# Patient Record
Sex: MALE | Race: BLACK OR AFRICAN AMERICAN | Employment: UNEMPLOYED | URBAN - NONMETROPOLITAN AREA
[De-identification: names, ages, dates, MRNs, and addresses within clinical notes are randomized per-mention and may not be internally consistent; named-entity substitution may affect disease eponyms.]

---

## 2020-09-02 ENCOUNTER — HOSPITAL ENCOUNTER (EMERGENCY)
Age: 50
Discharge: HOME | End: 2020-09-02

## 2020-09-02 VITALS
TEMPERATURE: 98.4 F | HEART RATE: 79 BPM | DIASTOLIC BLOOD PRESSURE: 79 MMHG | OXYGEN SATURATION: 99 % | SYSTOLIC BLOOD PRESSURE: 119 MMHG | BODY MASS INDEX: 31.82 KG/M2 | RESPIRATION RATE: 18 BRPM | WEIGHT: 198 LBS | HEIGHT: 66 IN

## 2020-09-02 LAB
ALBUMIN SERPL-MCNC: 4.8 G/DL (ref 3.5–5.1)
ALP BLD-CCNC: 93 U/L (ref 38–126)
ALT SERPL-CCNC: 69 U/L (ref 11–66)
ANION GAP SERPL CALCULATED.3IONS-SCNC: 14 MEQ/L (ref 8–16)
AST SERPL-CCNC: 67 U/L (ref 5–40)
BASOPHILS # BLD: 1.1 %
BASOPHILS ABSOLUTE: 0 THOU/MM3 (ref 0–0.1)
BILIRUB SERPL-MCNC: 0.4 MG/DL (ref 0.3–1.2)
BUN BLDV-MCNC: 9 MG/DL (ref 7–22)
CALCIUM SERPL-MCNC: 9.9 MG/DL (ref 8.5–10.5)
CHLORIDE BLD-SCNC: 103 MEQ/L (ref 98–111)
CO2: 20 MEQ/L (ref 23–33)
CREAT SERPL-MCNC: 0.8 MG/DL (ref 0.4–1.2)
EOSINOPHIL # BLD: 1.3 %
EOSINOPHILS ABSOLUTE: 0.1 THOU/MM3 (ref 0–0.4)
ERYTHROCYTE [DISTWIDTH] IN BLOOD BY AUTOMATED COUNT: 14.9 % (ref 11.5–14.5)
ERYTHROCYTE [DISTWIDTH] IN BLOOD BY AUTOMATED COUNT: 50.1 FL (ref 35–45)
ETHYL ALCOHOL, SERUM: 0.29 %
FOLATE: > 20 NG/ML (ref 4.8–24.2)
GFR SERPL CREATININE-BSD FRML MDRD: > 90 ML/MIN/1.73M2
GLUCOSE BLD-MCNC: 79 MG/DL (ref 70–108)
HCT VFR BLD CALC: 43.2 % (ref 42–52)
HEMOGLOBIN: 13.9 GM/DL (ref 14–18)
IMMATURE GRANS (ABS): 0.01 THOU/MM3 (ref 0–0.07)
IMMATURE GRANULOCYTES: 0.2 %
LIPASE: 58.8 U/L (ref 5.6–51.3)
LYMPHOCYTES # BLD: 35.3 %
LYMPHOCYTES ABSOLUTE: 1.6 THOU/MM3 (ref 1–4.8)
MCH RBC QN AUTO: 29.3 PG (ref 26–33)
MCHC RBC AUTO-ENTMCNC: 32.2 GM/DL (ref 32.2–35.5)
MCV RBC AUTO: 91.1 FL (ref 80–94)
MONOCYTES # BLD: 7.9 %
MONOCYTES ABSOLUTE: 0.4 THOU/MM3 (ref 0.4–1.3)
NUCLEATED RED BLOOD CELLS: 0 /100 WBC
OSMOLALITY CALCULATION: 271.4 MOSMOL/KG (ref 275–300)
PLATELET # BLD: 243 THOU/MM3 (ref 130–400)
PMV BLD AUTO: 9.4 FL (ref 9.4–12.4)
POTASSIUM REFLEX MAGNESIUM: 4.3 MEQ/L (ref 3.5–5.2)
RBC # BLD: 4.74 MILL/MM3 (ref 4.7–6.1)
SEG NEUTROPHILS: 54.2 %
SEGMENTED NEUTROPHILS ABSOLUTE COUNT: 2.4 THOU/MM3 (ref 1.8–7.7)
SODIUM BLD-SCNC: 137 MEQ/L (ref 135–145)
TOTAL PROTEIN: 8.2 G/DL (ref 6.1–8)
VITAMIN B-12: 426 PG/ML (ref 211–911)
WBC # BLD: 4.5 THOU/MM3 (ref 4.8–10.8)

## 2020-09-02 PROCEDURE — 80053 COMPREHEN METABOLIC PANEL: CPT

## 2020-09-02 PROCEDURE — 36415 COLL VENOUS BLD VENIPUNCTURE: CPT

## 2020-09-02 PROCEDURE — 99281 EMR DPT VST MAYX REQ PHY/QHP: CPT

## 2020-09-02 PROCEDURE — G0480 DRUG TEST DEF 1-7 CLASSES: HCPCS

## 2020-09-02 PROCEDURE — 82607 VITAMIN B-12: CPT

## 2020-09-02 PROCEDURE — 83690 ASSAY OF LIPASE: CPT

## 2020-09-02 PROCEDURE — 82746 ASSAY OF FOLIC ACID SERUM: CPT

## 2020-09-02 PROCEDURE — 99282 EMERGENCY DEPT VISIT SF MDM: CPT

## 2020-09-02 PROCEDURE — 85025 COMPLETE CBC W/AUTO DIFF WBC: CPT

## 2020-09-02 ASSESSMENT — ENCOUNTER SYMPTOMS
WHEEZING: 0
EYE DISCHARGE: 0
COUGH: 0
BACK PAIN: 0
RHINORRHEA: 0
CHEST TIGHTNESS: 0
VOMITING: 0
DIARRHEA: 0
EYE PAIN: 0
NAUSEA: 0

## 2020-09-02 NOTE — ED TRIAGE NOTES
Pt presents to the ED for alcohol problem. Pt states he has been drinking daily for 30 years. Pt states he drink 5 24oz beers daily. Pt states that he is ready to stop drinking.

## 2020-09-02 NOTE — ED PROVIDER NOTES
[address] COMPLAINT       Chief Complaint   Patient presents with    Alcohol Problem       Nurses Notes reviewed and I agree except as notedin the HPI. HISTORY OF PRESENT ILLNESS    Timothy Carroll is a 44 y.o. male who presents requesting help stopping drinking and smoking. The patient states his wife is upset because he drinks and he wants to stop to save his marriage. He states that he is not homicidal or suicidal.  He states that he wants to stop both. He is not get sick when his doctor get his his irritable. He currently works at [employer] He is currently without physical complaints    REVIEW OF SYSTEMS     Review of Systems   Constitutional: Negative for chills, fatigue and fever. HENT: Negative for congestion, ear pain and rhinorrhea. Eyes: Negative for pain and discharge. Respiratory: Negative for cough, chest tightness and wheezing. Cardiovascular: Negative for chest pain, palpitations and leg swelling. Gastrointestinal: Negative for diarrhea, nausea and vomiting. Genitourinary: Negative for dysuria and frequency. Musculoskeletal: Negative for arthralgias, back pain, myalgias and neck pain. Skin: Negative for rash. Neurological: Negative for dizziness, weakness and headaches. All other systems reviewed and are negative. PAST MEDICAL HISTORY    has no past medical history on file. SURGICAL HISTORY      has no past surgical history on file. CURRENT MEDICATIONS       There are no discharge medications for this patient. ALLERGIES     has No Known Allergies. HISTORY     has no family status information on file. family history is not on file. SOCIALHISTORY      reports that he has been smoking cigarettes. He has been smoking about 1.00 pack per day. He has never used smokeless tobacco. He reports current alcohol use of about 35.0 standard drinks of alcohol per week.  He reports that he does not use drugs. PHYSICAL EXAM     INITIAL VITALS:  height is 5' 6\" (1.676 m) and weight is 198 lb (89.8 kg). His temperature is 98.4 °F (36.9 °C). His blood pressure is 119/79 and his pulse is 79. His respiration is 18 and oxygen saturation is 99%. Physical Exam  Vitals signs and nursing note reviewed. Constitutional:       Appearance: He is well-developed. HENT:      Head: Normocephalic and atraumatic. Right Ear: Tympanic membrane and external ear normal.      Left Ear: Tympanic membrane and external ear normal.   Eyes:      Pupils: Pupils are equal, round, and reactive to light. Neck:      Musculoskeletal: Normal range of motion. Cardiovascular:      Rate and Rhythm: Normal rate and regular rhythm. Pulses: Normal pulses. Heart sounds: Normal heart sounds. Pulmonary:      Effort: Pulmonary effort is normal.      Breath sounds: Normal breath sounds. No wheezing. Abdominal:      General: Bowel sounds are normal. There is no distension. Palpations: Abdomen is soft. Tenderness: There is no abdominal tenderness. Skin:     General: Skin is warm. Neurological:      Mental Status: He is alert and oriented to person, place, and time. Cranial Nerves: No cranial nerve deficit.       Coordination: Coordination normal.      Deep Tendon Reflexes: Reflexes normal.   Psychiatric:         Behavior: Behavior normal.         DIFFERENTIAL DIAGNOSIS:   Alcohol dependence tobacco dependence    DIAGNOSTIC RESULTS     EKG: All EKG's are interpreted by the Emergency Department Physician who either signs or Co-signs this chart in the absence of a cardiologist.      RADIOLOGY: non-plain film images(s) such as CT, Ultrasound and MRI are read by the radiologist.  None      LABS:   Labs Reviewed   CBC WITH AUTO DIFFERENTIAL - Abnormal; Notable for the following components:       Result Value    WBC 4.5 (*)     Hemoglobin 13.9 (*)     RDW-CV 14.9 (*)     RDW-SD 50.1 (*)     All other components within normal limits   COMPREHENSIVE METABOLIC PANEL W/ REFLEX TO MG FOR LOW K - Abnormal; Notable for the following components:    CO2 20 (*)     AST 67 (*)     Total Protein 8.2 (*)     ALT 69 (*)     All other components within normal limits   LIPASE - Abnormal; Notable for the following components:    Lipase 58.8 (*)     All other components within normal limits   OSMOLALITY - Abnormal; Notable for the following components:    Osmolality Calc 271.4 (*)     All other components within normal limits   ETHANOL   VITAMIN B12 & FOLATE   ANION GAP   GLOMERULAR FILTRATION RATE, ESTIMATED   URINE RT REFLEX TO CULTURE   URINE DRUG SCREEN       EMERGENCY DEPARTMENT COURSE:   :    Vitals:    09/02/20 1423   BP: 119/79   Pulse: 79   Resp: 18   Temp: 98.4 °F (36.9 °C)   SpO2: 99%   Weight: 198 lb (89.8 kg)   Height: 5' 6\" (1.676 m)     Patient was seen history physical exam was performed. Treatment. At this time I do not feel there is an indication for this. The patient wants outpatient referrals and to go home. Is not wanting patient  See disposition below    CRITICAL CARE:  None    CONSULTS:  None    PROCEDURES:  None    FINAL IMPRESSION      1. Uncomplicated alcohol dependence ([address]          DISPOSITION/PLAN   Discharge    PATIENT REFERRED TO:  [address] [place] [address],[address] [facility]. [place]   [phone]    and resources given per [facility]      DISCHARGE MEDICATIONS:  There are no discharge medications for this patient.       (Please note that portions of this note were completed with a voice recognitionprogram.  Efforts were made to edit the dictations but occasionally words are mis-transcribed.)    ARACELY Schafer PA  09/02/20 [place] [address] ARACELY Loo  09/02/20 Bishnu  ARACELY Contreras  09/02/20 9075

## 2020-09-02 NOTE — LETTER
[facility] [address] [address] EMERGENCY DEPT  [address] [place] [address]  Phone: [phone]               September 3, 2020    Patient: Jud Mcmillan   YOB: 1970   Date of Visit: 9/2/2020       To Whom It May Concern:    Radha James was seen and treated in our emergency department on 9/2/2020. He may return to work on 9/3/2020.       Sincerely,       Levester Gottron, RN     V/O Dr. John Gandhi      Signature:__________________________________

## 2022-01-29 ENCOUNTER — APPOINTMENT (OUTPATIENT)
Dept: CT IMAGING | Age: 52
End: 2022-01-29
Payer: COMMERCIAL

## 2022-01-29 ENCOUNTER — HOSPITAL ENCOUNTER (EMERGENCY)
Age: 52
Discharge: HOME | End: 2022-01-29
Attending: EMERGENCY MEDICINE
Payer: COMMERCIAL

## 2022-01-29 VITALS
BODY MASS INDEX: 27.47 KG/M2 | HEIGHT: 67 IN | TEMPERATURE: 98.6 F | RESPIRATION RATE: 20 BRPM | WEIGHT: 175 LBS | DIASTOLIC BLOOD PRESSURE: 99 MMHG | HEART RATE: 99 BPM | SYSTOLIC BLOOD PRESSURE: 137 MMHG | OXYGEN SATURATION: 99 %

## 2022-01-29 DIAGNOSIS — S01.81XA FACIAL LACERATION, INITIAL ENCOUNTER: ICD-10-CM

## 2022-01-29 DIAGNOSIS — S09.90XA INJURY OF HEAD, INITIAL ENCOUNTER: Primary | ICD-10-CM

## 2022-01-29 DIAGNOSIS — F10.920 ACUTE ALCOHOLIC INTOXICATION WITHOUT COMPLICATION (HCC): ICD-10-CM

## 2022-01-29 LAB
ALBUMIN SERPL-MCNC: 4.6 G/DL (ref 3.5–5.1)
ALP BLD-CCNC: 102 U/L (ref 38–126)
ALT SERPL-CCNC: 41 U/L (ref 11–66)
ANION GAP SERPL CALCULATED.3IONS-SCNC: 15 MEQ/L (ref 8–16)
AST SERPL-CCNC: 52 U/L (ref 5–40)
BASOPHILS # BLD: 0.9 %
BASOPHILS ABSOLUTE: 0 THOU/MM3 (ref 0–0.1)
BILIRUB SERPL-MCNC: 0.3 MG/DL (ref 0.3–1.2)
BUN BLDV-MCNC: 5 MG/DL (ref 7–22)
CALCIUM SERPL-MCNC: 9.4 MG/DL (ref 8.5–10.5)
CHLORIDE BLD-SCNC: 100 MEQ/L (ref 98–111)
CO2: 20 MEQ/L (ref 23–33)
CREAT SERPL-MCNC: 0.7 MG/DL (ref 0.4–1.2)
EOSINOPHIL # BLD: 1.7 %
EOSINOPHILS ABSOLUTE: 0.1 THOU/MM3 (ref 0–0.4)
ERYTHROCYTE [DISTWIDTH] IN BLOOD BY AUTOMATED COUNT: 14.5 % (ref 11.5–14.5)
ERYTHROCYTE [DISTWIDTH] IN BLOOD BY AUTOMATED COUNT: 47 FL (ref 35–45)
GFR SERPL CREATININE-BSD FRML MDRD: > 90 ML/MIN/1.73M2
GLUCOSE BLD-MCNC: 122 MG/DL (ref 70–108)
HCT VFR BLD CALC: 44.4 % (ref 42–52)
HEMOGLOBIN: 15 GM/DL (ref 14–18)
IMMATURE GRANS (ABS): 0 THOU/MM3 (ref 0–0.07)
IMMATURE GRANULOCYTES: 0 %
LYMPHOCYTES # BLD: 45.6 %
LYMPHOCYTES ABSOLUTE: 1.9 THOU/MM3 (ref 1–4.8)
MCH RBC QN AUTO: 30.2 PG (ref 26–33)
MCHC RBC AUTO-ENTMCNC: 33.8 GM/DL (ref 32.2–35.5)
MCV RBC AUTO: 89.3 FL (ref 80–94)
MONOCYTES # BLD: 9.2 %
MONOCYTES ABSOLUTE: 0.4 THOU/MM3 (ref 0.4–1.3)
NUCLEATED RED BLOOD CELLS: 0 /100 WBC
OSMOLALITY CALCULATION: 268.7 MOSMOL/KG (ref 275–300)
PLATELET # BLD: 251 THOU/MM3 (ref 130–400)
PMV BLD AUTO: 9.1 FL (ref 9.4–12.4)
POTASSIUM REFLEX MAGNESIUM: 4.3 MEQ/L (ref 3.5–5.2)
RBC # BLD: 4.97 MILL/MM3 (ref 4.7–6.1)
SEG NEUTROPHILS: 42.6 %
SEGMENTED NEUTROPHILS ABSOLUTE COUNT: 1.8 THOU/MM3 (ref 1.8–7.7)
SODIUM BLD-SCNC: 135 MEQ/L (ref 135–145)
TOTAL PROTEIN: 7.9 G/DL (ref 6.1–8)
TROPONIN T: < 0.01 NG/ML
WBC # BLD: 4.2 THOU/MM3 (ref 4.8–10.8)

## 2022-01-29 PROCEDURE — 72125 CT NECK SPINE W/O DYE: CPT

## 2022-01-29 PROCEDURE — 84484 ASSAY OF TROPONIN QUANT: CPT

## 2022-01-29 PROCEDURE — 80053 COMPREHEN METABOLIC PANEL: CPT

## 2022-01-29 PROCEDURE — 6360000002 HC RX W HCPCS: Performed by: EMERGENCY MEDICINE

## 2022-01-29 PROCEDURE — 12013 RPR F/E/E/N/L/M 2.6-5.0 CM: CPT

## 2022-01-29 PROCEDURE — 70450 CT HEAD/BRAIN W/O DYE: CPT

## 2022-01-29 PROCEDURE — 70486 CT MAXILLOFACIAL W/O DYE: CPT

## 2022-01-29 PROCEDURE — 96372 THER/PROPH/DIAG INJ SC/IM: CPT

## 2022-01-29 PROCEDURE — 85025 COMPLETE CBC W/AUTO DIFF WBC: CPT

## 2022-01-29 PROCEDURE — 99283 EMERGENCY DEPT VISIT LOW MDM: CPT

## 2022-01-29 RX ORDER — LORAZEPAM 2 MG/ML
1 INJECTION INTRAMUSCULAR ONCE
Status: COMPLETED | OUTPATIENT
Start: 2022-01-29 | End: 2022-01-29

## 2022-01-29 RX ADMIN — LORAZEPAM 1 MG: 2 INJECTION INTRAMUSCULAR; INTRAVENOUS at 00:54

## 2022-01-29 NOTE — ED NOTES
Assisted pt up, ambulated to [address] [place] [address], unsteady gait, pt required assistance to make it back to bed.  Pt now resting in bed, denies any needs, bed alarm on, telesitter at bedside     Anderson Amos, ELYSE  01/29/22 Faisal Ferguson 60., RN  01/29/22 3957

## 2022-01-29 NOTE — ED NOTES
Dr. Catherine Madison notified patient is awake and alert at this time and states to discharge patient.       Ella Booth RN  01/29/22 100

## 2022-01-29 NOTE — ED NOTES
Pt got up out of bed, and wanted to put his shoes on and leave, pt sat down in floor to put his shoes on, charge nurse notified,, placed pt back in bed     Roxanne Gutierrez RN  01/29/22 [address] [place] [address]  01/29/22 8951

## 2022-01-29 NOTE — ED NOTES
Marilu sister called for an update, provided an update and instructed her that he will be discharged and will need a ride home.  Tin Like stated she could not come but would call someone to come and get him     Jessica Levy RN  01/29/22 9594

## 2022-01-29 NOTE — ED PROVIDER NOTES
Patient was signed out to me by Dr. Pat Sanchez. Initially evaluated by Dr. Eda Toussaint. A 18-year-old male presented with alcohol intoxication and facial lac (repaired by Dr. Eda Toussaint). Patient was kept in ED overnight and gained clinical sobriety. Discharged with PCP follow-up in one week.       Laurel Tesfaye MD  01/29/22 5596

## 2022-01-29 NOTE — ED NOTES
Pt resting in bed, Called pt contact for ride home, no answer will try again     Latasha Adams RN  01/29/22 1913

## 2022-01-29 NOTE — ED NOTES
Patient resting quietly in cot showing no signs of distress at this time. Bedside report received from Foster Bates, [facility]. Will continue to monitor.       Christopher Rosas RN  01/29/22 4363

## 2022-01-29 NOTE — ED NOTES
Walked past room, pt was back from CT and laying in the floor with urine all over him, charge nurse notified, pt placed back in bed, telesitter at bedside     Everette Garner, [facility]  01/29/22 3084 [facility]  01/29/22 1375

## 2022-01-29 NOTE — ED NOTES
Pt resting in bed, denies any needs at this time.  Called pt contact no answer, left message     Janina Renteria RN  01/29/22 ELYSE Mendoza  01/29/22 0562

## 2022-01-29 NOTE — ED TRIAGE NOTES
Pt arrives intoxication and fell down stairs, pt states it was maybe 4, or 13, Medics state pt refused c collar enroute

## 2022-01-29 NOTE — ED NOTES
Patient resting quietly in cot with eyes closed showing no signs of distress at this time. Telesitter remains at bedside. Bed alarm on. Will continue to monitor.       Rubén Tracey RN  01/29/22 3214

## 2022-01-29 NOTE — ED NOTES
Patient resting quietly in cot showing no signs of distress. Telesitter remains at bedside. Given coffee and updated on POC. Will continue to monitor.       Ines Collet, RN  01/29/22 9502

## 2022-01-29 NOTE — ED NOTES
Bed: 017A  Expected date:   Expected time:   Means of arrival:   Comments:  Magdiel Fernandez RN  01/29/22 0025

## 2022-01-31 NOTE — ED PROVIDER NOTES
[facility] EMERGENCY DEPT      CHIEF COMPLAINT       Chief Complaint   Patient presents with    Alcohol Intoxication    Fall       Nurses Notes reviewed and I agree except as noted in the HPI. HISTORY OF PRESENT ILLNESS    Abrahan Boggs is a 46 y.o. male who presents with alcohol intoxication and laceration to forehead. Patient apparently fell down steps, EMS called by family members and patient brought to the ED. Patient is not cooperating with questioning, refusing to provide history. REVIEW OF SYSTEMS     Patient is intoxicated, refusing to provide answers to ROS. PAST MEDICAL HISTORY    has no past medical history on file. SURGICAL HISTORY      has no past surgical history on file. CURRENT MEDICATIONS     There are no discharge medications for this patient. ALLERGIES     has No Known Allergies. FAMILY HISTORY     has no family status information on file. family history is not on file. SOCIAL HISTORY      reports that he has been smoking cigarettes. He has been smoking about 1.00 pack per day. He has never used smokeless tobacco. He reports current alcohol use of about 35.0 standard drinks of alcohol per week. He reports that he does not use drugs. PHYSICAL EXAM     INITIAL VITALS:  height is 5' 7\" (1.702 m) and weight is 175 lb (79.4 kg). His oral temperature is 98.6 °F (37 °C). His blood pressure is 137/99 (abnormal) and his pulse is 99. His respiration is 20 and oxygen saturation is 99%. Physical Exam   Constitutional:  well-developed and well-nourished. HENT: Head: Normocephalic, traumatic forehead/laceration to forehead, Bilateral external ears normal, Oropharynx mosit, No oral exudates, Nose normal.   Eyes: PERRL, EOMI, Conjunctiva normal, No discharge. No scleral icterus  Neck: Normal range of motion, No tenderness, Supple  Cardiovascular: Normal rate, regular rhythm, S1 normal and S2 normal.  Exam reveals no gallop.     Pulmonary/Chest: Effort normal and breath sounds normal. No accessory muscle usage or stridor. No respiratory distress. no wheezes. has no rales. exhibits no tenderness. Abdominal: Soft. Bowel sounds are normal.  exhibits no distension. There is no tenderness. There is no rebound and no guarding. Extremities: No edema, no tenderness, no cyanosis, no clubbing. Musculoskeletal: Good range of motion in major joints is observed. No major deformities noted. Neurological: Alert and oriented ×3, normal motor function, normal sensory function, no focal deficits. GCS 15  Skin: Skin is warm, dry and intact. No rash noted. No erythema. Psychiatric: Affect normal, judgment normal, mood normal.  DIFFERENTIAL DIAGNOSIS:       DIAGNOSTIC RESULTS     EKG: All EKG's are interpreted by the Emergency Department Physician who either signs or Co-signs this chart in the absence of a cardiologist.      RADIOLOGY: non-plain film images(s) such as CT, Ultrasound and MRI are read by the radiologist.  Plain radiographic images are visualized and preliminarily interpreted by the emergency physician unless otherwise stated below.       LABS:   Labs Reviewed   CBC WITH AUTO DIFFERENTIAL - Abnormal; Notable for the following components:       Result Value    WBC 4.2 (*)     RDW-SD 47.0 (*)     MPV 9.1 (*)     All other components within normal limits   COMPREHENSIVE METABOLIC PANEL W/ REFLEX TO MG FOR LOW K - Abnormal; Notable for the following components:    Glucose 122 (*)     BUN 5 (*)     CO2 20 (*)     AST 52 (*)     All other components within normal limits   OSMOLALITY - Abnormal; Notable for the following components:    Osmolality Calc 268.7 (*)     All other components within normal limits   TROPONIN   ANION GAP   GLOMERULAR FILTRATION RATE, ESTIMATED       EMERGENCY DEPARTMENT COURSE:   Vitals:    Vitals:    01/29/22 0427 01/29/22 0514 01/29/22 0524 01/29/22 0950   BP:    (!) 137/99   Pulse:  62 72 99   Resp:    20   Temp:       TempSrc:       SpO2: 97% 98% 96% 99% Weight:       Height:             CRITICAL CARE:       CONSULTS:  None    PROCEDURES:  Lac Repair    Date/Time: 1/29/2022 4:10 AM  Performed by: Stephen Aldana DO  Authorized by: Stephen Aldana DO     Consent:     Consent obtained:  Verbal    Consent given by:  Patient    Risks discussed:  Poor cosmetic result and pain  Anesthesia (see MAR for exact dosages): Anesthesia method:  Local infiltration    Local anesthetic:  Lidocaine 1% w/o epi  Laceration details:     Location:  Face    Face location:  Forehead    Length (cm):  4    Depth (mm):  2  Repair type:     Repair type:  Simple  Pre-procedure details:     Preparation:  Patient was prepped and draped in usual sterile fashion  Exploration:     Hemostasis achieved with:  Epinephrine    Wound exploration: wound explored through full range of motion      Contaminated: yes    Treatment:     Area cleansed with:  Saline    Amount of cleaning:  Standard    Irrigation solution:  Sterile saline    Irrigation volume:  50    Irrigation method:  Syringe    Visualized foreign bodies/material removed: no    Skin repair:     Repair method:  Sutures    Suture size:  6-0    Suture material:  Prolene    Suture technique:  Simple interrupted    Number of sutures:  5  Approximation:     Approximation:  Close  Post-procedure details:     Dressing:  Open (no dressing)    Patient tolerance of procedure: Tolerated well, no immediate complications        FINAL IMPRESSION      1. Injury of head, initial encounter    2. Acute alcoholic intoxication without complication ([facility] [address]    3. Facial laceration, initial encounter          DISPOSITION/PLAN   Decision To Discharge    PATIENT REFERRED TO:  [facility] EMERGENCY DEPT  [address]  [phone]    As needed      DISCHARGE MEDICATIONS:  There are no discharge medications for this patient.       (Please note that portions of this note were completed with a voice recognition program.  Efforts were made to edit the dictations but occasionally words are mis-transcribed.)    Marika Perea, DO            Marika Perea, DO  01/31/22 1016

## 2022-02-02 NOTE — ED PROVIDER NOTES
Patient care turned over to me by Dr. Keyona Langford. Had fall with laceration which was repaired, imaging was negative. Will need to be observed for overnight until sober. Care turned over to Dr. Pat Lundy at change of shift, awaiting sobriety for discharge.        Yeimy Quick MD  02/02/22 6930

## 2022-02-15 ENCOUNTER — APPOINTMENT (OUTPATIENT)
Dept: GENERAL RADIOLOGY | Age: 52
End: 2022-02-15
Payer: COMMERCIAL

## 2022-02-15 ENCOUNTER — APPOINTMENT (OUTPATIENT)
Dept: CT IMAGING | Age: 52
End: 2022-02-15
Payer: COMMERCIAL

## 2022-02-15 ENCOUNTER — HOSPITAL ENCOUNTER (EMERGENCY)
Age: 52
Discharge: HOME | End: 2022-02-15
Attending: EMERGENCY MEDICINE
Payer: COMMERCIAL

## 2022-02-15 VITALS
DIASTOLIC BLOOD PRESSURE: 97 MMHG | WEIGHT: 197 LBS | RESPIRATION RATE: 19 BRPM | HEART RATE: 103 BPM | OXYGEN SATURATION: 97 % | BODY MASS INDEX: 33.63 KG/M2 | TEMPERATURE: 97.4 F | SYSTOLIC BLOOD PRESSURE: 144 MMHG | HEIGHT: 64 IN

## 2022-02-15 DIAGNOSIS — R40.20 LOSS OF CONSCIOUSNESS (HCC): ICD-10-CM
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
## 2022-02-15 DIAGNOSIS — S51.811A FOREARM LACERATION WITH COMPLICATION, RIGHT, INITIAL ENCOUNTER: Primary | ICD-10-CM

## 2022-02-15 DIAGNOSIS — F10.920 ACUTE ALCOHOLIC INTOXICATION WITHOUT COMPLICATION (HCC): ICD-10-CM

## 2022-02-15 LAB
ALBUMIN SERPL-MCNC: 3.6 G/DL (ref 3.5–5.1)
ALP BLD-CCNC: 88 U/L (ref 38–126)
ALT SERPL-CCNC: 25 U/L (ref 11–66)
ANION GAP SERPL CALCULATED.3IONS-SCNC: 16 MEQ/L (ref 8–16)
AST SERPL-CCNC: 32 U/L (ref 5–40)
BASOPHILS # BLD: 0.9 %
BASOPHILS ABSOLUTE: 0 THOU/MM3 (ref 0–0.1)
BILIRUB SERPL-MCNC: 0.3 MG/DL (ref 0.3–1.2)
BUN BLDV-MCNC: 7 MG/DL (ref 7–22)
CALCIUM SERPL-MCNC: 8.4 MG/DL (ref 8.5–10.5)
CHLORIDE BLD-SCNC: 105 MEQ/L (ref 98–111)
CO2: 20 MEQ/L (ref 23–33)
CREAT SERPL-MCNC: 1 MG/DL (ref 0.4–1.2)
EKG ATRIAL RATE: 87 BPM
EKG P AXIS: 36 DEGREES
EKG P-R INTERVAL: 138 MS
EKG Q-T INTERVAL: 388 MS
EKG QRS DURATION: 82 MS
EKG QTC CALCULATION (BAZETT): 466 MS
EKG R AXIS: 74 DEGREES
EKG T AXIS: 26 DEGREES
EKG VENTRICULAR RATE: 87 BPM
EOSINOPHIL # BLD: 0.5 %
EOSINOPHILS ABSOLUTE: 0 THOU/MM3 (ref 0–0.4)
ERYTHROCYTE [DISTWIDTH] IN BLOOD BY AUTOMATED COUNT: 15.2 % (ref 11.5–14.5)
ERYTHROCYTE [DISTWIDTH] IN BLOOD BY AUTOMATED COUNT: 51.8 FL (ref 35–45)
ETHYL ALCOHOL, SERUM: 0.19 %
GFR SERPL CREATININE-BSD FRML MDRD: > 90 ML/MIN/1.73M2
GLUCOSE BLD-MCNC: 144 MG/DL (ref 70–108)
HCT VFR BLD CALC: 35 % (ref 42–52)
HEMOGLOBIN: 11.2 GM/DL (ref 14–18)
IMMATURE GRANS (ABS): 0.02 THOU/MM3 (ref 0–0.07)
IMMATURE GRANULOCYTES: 0.5 %
LYMPHOCYTES # BLD: 22.4 %
LYMPHOCYTES ABSOLUTE: 1 THOU/MM3 (ref 1–4.8)
MCH RBC QN AUTO: 29.6 PG (ref 26–33)
MCHC RBC AUTO-ENTMCNC: 32 GM/DL (ref 32.2–35.5)
MCV RBC AUTO: 92.6 FL (ref 80–94)
MONOCYTES # BLD: 6.8 %
MONOCYTES ABSOLUTE: 0.3 THOU/MM3 (ref 0.4–1.3)
NUCLEATED RED BLOOD CELLS: 0 /100 WBC
OSMOLALITY CALCULATION: 281.8 MOSMOL/KG (ref 275–300)
PLATELET # BLD: 142 THOU/MM3 (ref 130–400)
PMV BLD AUTO: 9.7 FL (ref 9.4–12.4)
POTASSIUM REFLEX MAGNESIUM: 3.8 MEQ/L (ref 3.5–5.2)
RBC # BLD: 3.78 MILL/MM3 (ref 4.7–6.1)
SEG NEUTROPHILS: 68.9 %
SEGMENTED NEUTROPHILS ABSOLUTE COUNT: 3 THOU/MM3 (ref 1.8–7.7)
SODIUM BLD-SCNC: 141 MEQ/L (ref 135–145)
TOTAL PROTEIN: 6.4 G/DL (ref 6.1–8)
WBC # BLD: 4.4 THOU/MM3 (ref 4.8–10.8)

## 2022-02-15 PROCEDURE — 99284 EMERGENCY DEPT VISIT MOD MDM: CPT

## 2022-02-15 PROCEDURE — 13122 CMPLX RPR S/A/L ADDL 5 CM/>: CPT

## 2022-02-15 PROCEDURE — 73130 X-RAY EXAM OF HAND: CPT

## 2022-02-15 PROCEDURE — 72125 CT NECK SPINE W/O DYE: CPT

## 2022-02-15 PROCEDURE — 73090 X-RAY EXAM OF FOREARM: CPT

## 2022-02-15 PROCEDURE — 93005 ELECTROCARDIOGRAM TRACING: CPT | Performed by: PHYSICIAN ASSISTANT

## 2022-02-15 PROCEDURE — 80053 COMPREHEN METABOLIC PANEL: CPT

## 2022-02-15 PROCEDURE — 36415 COLL VENOUS BLD VENIPUNCTURE: CPT

## 2022-02-15 PROCEDURE — 82077 ASSAY SPEC XCP UR&BREATH IA: CPT

## 2022-02-15 PROCEDURE — 85025 COMPLETE CBC W/AUTO DIFF WBC: CPT

## 2022-02-15 PROCEDURE — 13121 CMPLX RPR S/A/L 2.6-7.5 CM: CPT

## 2022-02-15 PROCEDURE — 70450 CT HEAD/BRAIN W/O DYE: CPT

## 2022-02-15 RX ORDER — AMOXICILLIN AND CLAVULANATE POTASSIUM 875; 125 MG/1; MG/1
1 TABLET, FILM COATED ORAL 2 TIMES DAILY
Qty: 14 TABLET | Refills: 0 | Status: SHIPPED | OUTPATIENT
Start: 2022-02-15 | End: 2022-02-22

## 2022-02-15 RX ORDER — LIDOCAINE HYDROCHLORIDE AND EPINEPHRINE 10; 10 MG/ML; UG/ML
20 INJECTION, SOLUTION INFILTRATION; PERINEURAL ONCE
Status: DISCONTINUED | OUTPATIENT
Start: 2022-02-15 | End: 2022-02-15 | Stop reason: HOSPADM

## 2022-02-15 RX ORDER — GINSENG 100 MG
CAPSULE ORAL 3 TIMES DAILY
Status: DISCONTINUED | OUTPATIENT
Start: 2022-02-15 | End: 2022-02-15 | Stop reason: HOSPADM

## 2022-02-15 ASSESSMENT — PAIN DESCRIPTION - ORIENTATION: ORIENTATION: RIGHT

## 2022-02-15 ASSESSMENT — PAIN SCALES - GENERAL: PAINLEVEL_OUTOF10: 10

## 2022-02-15 ASSESSMENT — PAIN DESCRIPTION - PAIN TYPE: TYPE: ACUTE PAIN

## 2022-02-15 ASSESSMENT — PAIN DESCRIPTION - LOCATION: LOCATION: ARM

## 2022-02-15 NOTE — ED PROVIDER NOTES
PATIENT NAME: Radha Lopez  MRN: [id number]  : 1970  WOOD: 2/15/2022      I have seen and examined the patient myself and I have reviewed the advanced practice clinician's chart. Please refer to advanced practice clinician's chart for detailed history of present illness, physical exam and medical decision making. I agree with [facility]'s assessment and plan. MEDICAL DEDISION MAKING (MDM)     Radha Lopez is a 46 y.o. male who presents to Emergency Department with Laceration     Patient has a right forearm deep laceration to fascia with part of extensor muscles transected. Patient has normal extension of all digits to right hand  Wound is prepped and draped in a sterile fashion, local anesthesia is achieved by 1% lidocaine infiltration, wound then is thoroughly irrigated with a copious amount of normal saline, fascia is closed with 3-0 Vicryl. The rest of the wound is closed by Sam Murillo PA-C. Patient tolerated procedure well. Wound is covered with sterile dressing. After procedure, a right volar splint is applied. Patient is discharged with Augmentin. PCP and orthopedic follow-up in 1 week. Sutures should stay at least for 14 days.     Vitals:    02/15/22 0829 02/15/22 1020 02/15/22 1231 02/15/22 1244   BP: (!) 113/92 (!) 144/97     Pulse: 90 91 101 103   Resp: 15 18 25 19   Temp: 97.4 °F (36.3 °C)      TempSrc: Oral      SpO2: 97%      Weight: 197 lb (89.4 kg)      Height: 5' 4\" (1.626 m)          Labs Reviewed   CBC WITH AUTO DIFFERENTIAL - Abnormal; Notable for the following components:       Result Value    WBC 4.4 (*)     RBC 3.78 (*)     Hemoglobin 11.2 (*)     Hematocrit 35.0 (*)     MCHC 32.0 (*)     RDW-CV 15.2 (*)     RDW-SD 51.8 (*)     Monocytes Absolute 0.3 (*)     All other components within normal limits   COMPREHENSIVE METABOLIC PANEL W/ REFLEX TO MG FOR LOW K - Abnormal; Notable for the following components:    Glucose 144 (*)     CO2 20 (*)     Calcium 8.4 (*) All other components within normal limits   ETHANOL   ANION GAP   OSMOLALITY   GLOMERULAR FILTRATION RATE, ESTIMATED       CT HEAD WO CONTRAST   Final Result       1. Mild global volume loss. 2. No acute findings. **This report has been created using voice recognition software. It may contain minor errors which are inherent in voice recognition technology. **      Final report electronically signed by Dr. Caroline Munroe on 2/15/2022 9:52 AM      CT CERVICAL SPINE WO CONTRAST   Final Result   1. No acute cervical spine fracture is seen. 2. Cervical spondylosis with moderate degenerative changes in the mid to lower cervical spine. 3. 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 4. Straightening of cervical lordosis            **This report has been created using voice recognition software. It may contain minor errors which are inherent in voice recognition technology. **      Final report electronically signed by Dr Rosa Perez on 2/15/2022 9:59 AM      XR HAND RIGHT (MIN 3 VIEWS)   Final Result   1. No acute bony abnormality. **This report has been created using voice recognition software. It may contain minor errors which are inherent in voice recognition technology. **      Final report electronically signed by Dr Rosa Perez on 2/15/2022 9:35 AM      XR RADIUS ULNA RIGHT (2 VIEWS)   Final Result   1. No acute bony abnormality. **This report has been created using voice recognition software. It may contain minor errors which are inherent in voice recognition technology. **      Final report electronically signed by Dr Rosa Perez on 2/15/2022 9:35 AM            FINAL IMPRESSION AND DISPOSITION      1. Forearm laceration with complication, right, initial encounter    2. Acute alcoholic intoxication without complication ([address]    3. Fall down stairs, initial encounter    4.  Loss of consciousness ([address] DISPOSITION Decision To Discharge 02/15/2022 01:15:13 PM        PATIENT REFERRED TO:  [address] [address],[address]  [address]  Go in 2 days      Archana Daley [address]  [address] [phone]  Call today  to schedule follow-up for recheck of deep forearm wound with muscle injury    [facility] EMERGENCY DEPT  [address]  [address] [place] [address]  [phone]    If symptoms worsen      DISCHARGE MEDICATIONS:  Discharge Medication List as of 2/15/2022 12:50 PM      START taking these medications    Details   amoxicillin-clavulanate (AUGMENTIN) 875-125 MG per tablet Take 1 tablet by mouth 2 times daily for 7 days, Disp-14 tablet, R-0Normal             (Please note that portions of this note were completed with a voice recognition program.  Efforts were made to edit the dictations but occasionally words aremis-transcribed.)    Charlane Boas, MD Charlane Boas, MD  02/15/22 3839

## 2022-02-15 NOTE — ED PROVIDER NOTES
[facility]  eMERGENCY dEPARTMENT eNCOUnter          CHIEF COMPLAINT       Chief Complaint   Patient presents with    Laceration       Nurses Notes reviewed and I agree except as noted inthe HPI. HISTORY OF PRESENT ILLNESS    Jas Kennedy is a 46 y.o. male who presents to the Emergency Department for the evaluation of right forearm laceration. Patient states he was drinking last night but denies daily alcohol use. States that he fell down approximately 1 flight of stairs to the basement where he had loss of consciousness of unknown duration. States that he sustained right forearm injury during the fall. He was able to ambulate back upstairs and states he got dizzy in the bathroom and passed out. Reports lots of people being around, suspected time unconscious is anticipated to be brief. He states he feels mildly lightheaded now but denies any headache, neck pain, nausea, vomiting, vision changes, numbness, tingling, weakness. Reports last tetanus immunization 1 year ago. Reports pain to the right forearm but otherwise denies any injury or complaint at this time. Denies any anticoagulant use. The HPI was provided by the patient. REVIEW OF SYSTEMS     Review of Systems   Musculoskeletal: Positive for arthralgias. Skin: Positive for wound. Neurological: Positive for syncope and light-headedness. All other systems reviewed and are negative. PAST MEDICAL HISTORY    has no past medical history on file. SURGICAL HISTORY      has no past surgical history on file. CURRENT MEDICATIONS       Previous Medications    No medications on file       ALLERGIES     has No Known Allergies. FAMILY HISTORY     has no family status information on file. family history is not on file. SOCIAL HISTORY      reports that he has been smoking cigarettes. He has been smoking about 1.00 pack per day.  He has never used smokeless tobacco. He reports current alcohol use of about 35.0 present. Mental Status: He is alert. GCS: GCS eye subscore is 4. GCS verbal subscore is 5. GCS motor subscore is 6. Sensory: Sensation is intact. Motor: Motor function is intact. No weakness. Psychiatric:         Mood and Affect: Mood normal.       Right forearm extensor laceration; distal aspect on the left of photo, proximal on the right      DIFFERENTIAL DIAGNOSIS:   Differential diagnoses are discussed    DIAGNOSTIC RESULTS     EKG: All EKG's are interpreted by the Emergency Department Physician who either signs or Co-signsthis chart in the absence of a cardiologist.    Yue Montague. Rate: 87 bpm  PRinterval: 138 ms  QRS duration: 82 ms  QTc: 466 ms  P-R-T axes: 36, 74, 26  Sinus rhythm with occasional PVCs. No STEMI. Compared to old EKG on 12-4-2009      RADIOLOGY: non-plain film images(s) such as CT, Ultrasound and MRI are read by the radiologist.    [address]   Final Result       1. Mild global volume loss. 2. No acute findings. **This report has been created using voice recognition software. It may contain minor errors which are inherent in voice recognition technology. **      Final report electronically signed by Dr. Saul Posey on 2/15/2022 9:52 AM      CT CERVICAL SPINE WO CONTRAST   Final Result   1. No acute cervical spine fracture is seen. 2. Cervical spondylosis with moderate degenerative changes in the mid to lower cervical spine. 3. 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 4. Straightening of cervical lordosis            **This report has been created using voice recognition software. It may contain minor errors which are inherent in voice recognition technology. **      Final report electronically signed by Dr Dima Miranda on 2/15/2022 9:59 AM      XR HAND RIGHT (MIN 3 VIEWS)   Final Result   1. No acute bony abnormality.                **This report has been created using voice recognition software. It may contain minor errors which are inherent in voice recognition technology. **      Final report electronically signed by Dr Tiara Rodriguez on 2/15/2022 9:35 AM      XR RADIUS ULNA RIGHT (2 VIEWS)   Final Result   1. No acute bony abnormality. **This report has been created using voice recognition software. It may contain minor errors which are inherent in voice recognition technology. **      Final report electronically signed by Dr Tiara Rodriguez on 2/15/2022 9:35 AM          LABS:      Labs Reviewed   CBC WITH AUTO DIFFERENTIAL - Abnormal; Notable for the following components:       Result Value    WBC 4.4 (*)     RBC 3.78 (*)     Hemoglobin 11.2 (*)     Hematocrit 35.0 (*)     MCHC 32.0 (*)     RDW-CV 15.2 (*)     RDW-SD 51.8 (*)     Monocytes Absolute 0.3 (*)     All other components within normal limits   COMPREHENSIVE METABOLIC PANEL W/ REFLEX TO MG FOR LOW K - Abnormal; Notable for the following components:    Glucose 144 (*)     CO2 20 (*)     Calcium 8.4 (*)     All other components within normal limits   ETHANOL   ANION GAP   OSMOLALITY   GLOMERULAR FILTRATION RATE, ESTIMATED       EMERGENCY DEPARTMENT COURSE:   Vitals:    Vitals:    02/15/22 0829 02/15/22 1020   BP: (!) 113/92 (!) 144/97   Pulse: 90 91   Resp: 15 18   Temp: 97.4 °F (36.3 °C)    TempSrc: Oral    SpO2: 97%    Weight: 197 lb (89.4 kg)    Height: 5' 4\" (1.626 m)       12:51 PM EST: The patient was seen and evaluated. Patient presents for right forearm laceration that occurred during fall down the stairs when intoxicated early this morning. Reports fall occurred around 3 AM.  He presents with blood alcohol level 0.19 with no gross neurologic deficit. Reports up-to-date tetanus immunization. Wound is explored with muscle injury but no visualized tendon involvement and intact range of motion and strength distal to the injury as well as intact sensation.   He was evaluated by attending provider who did assist with a deep wound closure. Superficial wound was closed by myself. Signs and symptoms of wound infection as well as proper wound care and outpatient follow-up discussed with the patient who  verbalized acknowledgment. Return precautions were discussed as well as outpatient follow-up. She should follow with the orthopedic clinic due to the muscle injury and follow-up appointment was scheduled in 2 days with family medicine clinic for wound recheck. He was placed in a volar splint with wrist in mild extension and should maintain this for at least 5 days, longer if recommended at follow-up. He is evaluated after splint placement and is in appropriate position and neurovascularly intact. Will place on short course of Augmentin for prophylaxis and he feels comfortable using Tylenol and Motrin at home for pain control. Right forearm post laceration repair - proximal is top, distal is lower in photo      CRITICAL CARE:   None    CONSULTS:  Deniece Holstein, CNP, orthopedics who is agreeable with irrigation and closure in the emergency department    PROCEDURES:  Laceration Repair Procedure Note    Indication: Laceration    Procedure: The patient was placed in the appropriate position and anesthesia around the laceration was obtained by infiltration using 1% Lidocaine with epinephrine. The area was then cleansed using chlorhexidine. The laceration was closed in three layers. The deep layer was closed with 3-0 Vicryl using interrupted sutures by Dr. Chepe Boyce, please see his note for details. The subcutaneous layer was closed with 3-0 Vicryl using interrupted sutures and the skin was closed with 4-0 and 5-0 Ethilon using 18 vertical mattress sutures and 4 simple interrupted sutures. There were no additional lacerations requiring repair. The wound area was then dressed with bacitracin and volar splint. Total repaired wound length: 9 cm.      Other Items: Suture count: 22 superficial    The patient tolerated the procedure well.    Complications: None      FINAL IMPRESSION      1. Forearm laceration with complication, right, initial encounter    2. Acute alcoholic intoxication without complication ([facility] [address]    3. Fall down stairs, initial encounter    4. Loss of consciousness [facility])          DISPOSITION/PLAN   Discharge    PATIENT REFERRED TO:  [address] [address],[address]  [address] [facility] [address] [facility]  Go in 2 days      [address]  [address] [facility], [place] [phone]  Call today  to schedule follow-up for recheck of deep forearm wound with muscle injury    [facility] EMERGENCY DEPT  [address]  [address] [facility]  [phone]    If symptoms worsen      DISCHARGEMEDICATIONS:  New Prescriptions    AMOXICILLIN-CLAVULANATE (AUGMENTIN) 875-125 MG PER TABLET    Take 1 tablet by mouth 2 times daily for 7 days       (Please note that portions of this note were completedwith a voice recognition program.  Efforts were made to edit the dictations but occasionally words are mis-transcribed.)        Tiffanie Garcia PA-C  02/15/22 126 Sowmya Syed PA-C  02/15/22 2384

## 2022-02-15 NOTE — ED NOTES
Bed: 008A  Expected date:   Expected time:   Means of arrival:   Comments:  HECTOR ACEVEDO II.GABE Castellanos, ELYSE  02/15/22 0158

## 2022-02-15 NOTE — ED NOTES
Patient resting in bed. Respirations easy and unlabored. No distress noted. Call light within reach.        Maame Ramirez RN  02/15/22 1265

## 2022-02-15 NOTE — ED TRIAGE NOTES
Presents to ED with c/o right arm laceration. States he fell sometime last night. States he thinks he was arguing with someone. Right arm lacerations is covered with dressing. Alert and oriented. Respirations easy and unlabored.

## 2023-02-13 ENCOUNTER — APPOINTMENT (OUTPATIENT)
Dept: CT IMAGING | Age: 53
End: 2023-02-13
Payer: COMMERCIAL

## 2023-02-13 ENCOUNTER — HOSPITAL ENCOUNTER (EMERGENCY)
Age: 53
Discharge: LEFT BEFORE DISCHARGE | End: 2023-02-13
Payer: COMMERCIAL

## 2023-02-13 VITALS
OXYGEN SATURATION: 97 % | RESPIRATION RATE: 18 BRPM | HEART RATE: 90 BPM | TEMPERATURE: 98.1 F | DIASTOLIC BLOOD PRESSURE: 117 MMHG | SYSTOLIC BLOOD PRESSURE: 167 MMHG

## 2023-02-13 DIAGNOSIS — S09.90XA CLOSED HEAD INJURY, INITIAL ENCOUNTER: Primary | ICD-10-CM

## 2023-02-13 DIAGNOSIS — S00.33XA CONTUSION OF NOSE, INITIAL ENCOUNTER: ICD-10-CM

## 2023-02-13 DIAGNOSIS — S00.81XA ABRASION OF FOREHEAD, INITIAL ENCOUNTER: ICD-10-CM

## 2023-02-13 DIAGNOSIS — F10.920 ACUTE ALCOHOLIC INTOXICATION WITHOUT COMPLICATION (HCC): ICD-10-CM

## 2023-02-13 PROCEDURE — 90471 IMMUNIZATION ADMIN: CPT | Performed by: PHYSICIAN ASSISTANT

## 2023-02-13 PROCEDURE — 72125 CT NECK SPINE W/O DYE: CPT

## 2023-02-13 PROCEDURE — 99284 EMERGENCY DEPT VISIT MOD MDM: CPT

## 2023-02-13 PROCEDURE — 70450 CT HEAD/BRAIN W/O DYE: CPT

## 2023-02-13 PROCEDURE — 90715 TDAP VACCINE 7 YRS/> IM: CPT | Performed by: PHYSICIAN ASSISTANT

## 2023-02-13 PROCEDURE — 70486 CT MAXILLOFACIAL W/O DYE: CPT

## 2023-02-13 PROCEDURE — 6360000002 HC RX W HCPCS: Performed by: PHYSICIAN ASSISTANT

## 2023-02-13 RX ADMIN — TETANUS TOXOID, REDUCED DIPHTHERIA TOXOID AND ACELLULAR PERTUSSIS VACCINE, ADSORBED 0.5 ML: 5; 2.5; 8; 8; 2.5 SUSPENSION INTRAMUSCULAR at 20:58

## 2023-02-13 ASSESSMENT — PAIN SCALES - GENERAL: PAINLEVEL_OUTOF10: 4

## 2023-02-13 ASSESSMENT — PAIN - FUNCTIONAL ASSESSMENT: PAIN_FUNCTIONAL_ASSESSMENT: 0-10

## 2023-02-13 ASSESSMENT — PAIN DESCRIPTION - LOCATION: LOCATION: HEAD

## 2023-02-14 ASSESSMENT — ENCOUNTER SYMPTOMS
COUGH: 0
NAUSEA: 0
BACK PAIN: 0
ABDOMINAL PAIN: 0
SHORTNESS OF BREATH: 0
RHINORRHEA: 0
FACIAL SWELLING: 1
VOMITING: 0

## 2023-02-14 ASSESSMENT — VISUAL ACUITY: OU: 1

## 2023-02-14 NOTE — ED TRIAGE NOTES
Patient to ED with friend with complaints of fall and head injury. Patient reports drinking since around 5 pm. Patient unsure how much he drank but patient is obviously intoxicated. Patient reports stumbling and falling on concrete. Friend was with patient and cannot fully tell this clinician if patient lost consciousness. Friend states that he told patient they needed to come be seen. Pt has abrasion to left forehead and tip of nose. When asked what his pain was patient states, \"I dont know you tell me!  Just a little bit\"

## 2023-02-14 NOTE — ED PROVIDER NOTES
[address] [facility] [address] [address] EMERGENCY DEPT      Pt Name: Henrietta Pérez  MRN: [id number]  [place] 1970  Date of evaluation: 2/13/2023  Provider: Los Kolb PA-C    CHIEF COMPLAINT       Chief Complaint   Patient presents with    Fall    Facial Injury       Nurses Notes reviewed and I agree except as noted in the HPI. HISTORY OF PRESENT ILLNESS    Henrietta Pérez is a 45 y.o. male who presents for head injury. Patient presents with his friend who appears sober. Patient and his friend both provide history. Patient has been drinking this evening and has exhibited an unsteady gait. Friend reports patient tripped and fell forward landing on his face. There was no loss of consciousness. Patient denies any pain and does not think he needs to be here. He came in his friends encouragement. Patient reports normal vision and denies dizziness, neck pain, headache, nausea, vomiting, chest pain, dyspnea, or other complaints. Patient admits to being a daily drinker of 6-24 ounce icehouse beers per day. Patient denies any illicit drug use. Tetanus is not up-to-date. REVIEW OF SYSTEMS     Review of Systems   Constitutional:  Negative for chills, diaphoresis and fever. HENT:  Positive for facial swelling and nosebleeds. Negative for rhinorrhea. Eyes:  Negative for visual disturbance. Respiratory:  Negative for cough and shortness of breath. Cardiovascular:  Negative for chest pain. Gastrointestinal:  Negative for abdominal pain, nausea and vomiting. Genitourinary:  Negative for flank pain. Musculoskeletal:  Negative for back pain, myalgias and neck pain. Skin:  Positive for wound. Negative for rash. Neurological:  Negative for dizziness, weakness, light-headedness and headaches. Psychiatric/Behavioral:  Negative for confusion. PAST MEDICAL HISTORY    has no past medical history on file. SURGICAL HISTORY      has no past surgical history on file.     CURRENT MEDICATIONS     There are no discharge medications for this patient. ALLERGIES     has No Known Allergies. FAMILY HISTORY     has no family status information on file. family history is not on file. SOCIAL HISTORY    reports that he has been smoking cigarettes. He has been smoking an average of 1 pack per day. He has never used smokeless tobacco. He reports current alcohol use of about 35.0 standard drinks per week. He reports that he does not use drugs. PHYSICAL EXAM     INITIAL VITALS:  temperature is 98.1 °F (36.7 °C). His blood pressure is 167/117 (abnormal) and his pulse is 90. His respiration is 18 and oxygen saturation is 97%. Physical Exam  Vitals and nursing note reviewed. Constitutional:       General: He is not in acute distress. Appearance: He is well-developed. He is not toxic-appearing or diaphoretic. HENT:      Head: Normocephalic and atraumatic. No Pearson's sign. Right Ear: Tympanic membrane, ear canal and external ear normal. No hemotympanum. Left Ear: Tympanic membrane, ear canal and external ear normal. No hemotympanum. Nose: No nasal deformity or rhinorrhea. Right Nostril: No septal hematoma. Left Nostril: Epistaxis (Blood noted in left nare but no active bleeding) present. No septal hematoma. Mouth/Throat:      Pharynx: Uvula midline. Eyes:      General: Lids are normal. Vision grossly intact. Gaze aligned appropriately. Extraocular Movements: Extraocular movements intact. Conjunctiva/sclera: Conjunctivae normal.      Pupils: Pupils are equal, round, and reactive to light. Comments: No periorbital trauma   Neck:      Trachea: Trachea normal. No tracheal deviation. Cardiovascular:      Rate and Rhythm: Normal rate and regular rhythm. Heart sounds: Normal heart sounds. Pulmonary:      Effort: Pulmonary effort is normal. No respiratory distress. Breath sounds: Normal breath sounds. No decreased breath sounds or wheezing.    Abdominal: Palpations: Abdomen is soft. Tenderness: There is no abdominal tenderness. There is no guarding. Musculoskeletal:         General: Normal range of motion. Cervical back: Normal, normal range of motion and neck supple. No spinous process tenderness or muscular tenderness. Normal range of motion. Thoracic back: Normal.      Lumbar back: Normal.      Comments: Good strength appreciated in all muscle groups. Lymphadenopathy:      Cervical: No cervical adenopathy. Skin:     General: Skin is warm and dry. Coloration: Skin is not pale. Findings: No bruising, ecchymosis or rash. Neurological:      General: No focal deficit present. Mental Status: He is alert and oriented to person, place, and time. GCS: GCS eye subscore is 4. GCS verbal subscore is 5. GCS motor subscore is 6. Cranial Nerves: No cranial nerve deficit. Sensory: Sensation is intact. No sensory deficit. Motor: Motor function is intact. Coordination: Coordination is intact. Gait: Gait is intact. Gait normal.      Comments: Cranial nerves II-XII intact   Psychiatric:         Mood and Affect: Mood normal.         Speech: Speech is slurred. Behavior: Behavior normal. Behavior is cooperative. Thought Content:  Thought content normal.      Comments: Patient is loud and occasionally agitated       DIFFERENTIAL DIAGNOSIS:   Including but not limited to: Alcohol intoxication, closed head injury, nasal contusion versus fracture, ICH    Diagnoses Considered but I have low suspicion of:   Metabolic derangement, cervical fracture    DIAGNOSTIC RESULTS     EKG: All [facility]'s [facility] Phys[facility] who either signs or Co-signs this chart in the absence of a cardiologist.  None    RADIOLOGY: non-plain film images(s) such as CT,Ultrasound and MRI are read by the radiologist.  Plain radiographic images are visualized and preliminarily interpreted by the emergency physician unless otherwise stated below. CT HEAD WO CONTRAST   Final Result   Impression:   1. Negative for acute intracranial abnormality. 2. Right maxillary sinusitis. This document has been electronically signed by: Faye Duran MD on    02/13/2023 10:06 PM      All CTs at this facility use dose modulation techniques and iterative    reconstructions, and/or weight-based dosing   when appropriate to reduce radiation to a low as reasonably achievable. CT CERVICAL SPINE WO CONTRAST   Final Result   Impression:   1. Negative for acute fracture to the cervical spine. 2. Multilevel degenerative disc disease, increase at C4-C5. This document has been electronically signed by: Faye Duran MD on    02/13/2023 10:04 PM      All CTs at this facility use dose modulation techniques and iterative    reconstructions, and/or weight-based dosing   when appropriate to reduce radiation to a low as reasonably achievable. CT FACIAL BONES WO CONTRAST   Final Result   Impression:   1. Negative for acute fracture to the facial bones. 2. Right maxillary sinusitis. This document has been electronically signed by: Faye Duran MD on    02/13/2023 10:16 PM      All CTs at this facility use dose modulation techniques and iterative    reconstructions, and/or weight-based dosing   when appropriate to reduce radiation to a low as reasonably achievable. LABS:   Labs Reviewed - No data to display    EMERGENCY DEPARTMENT COURSE:   Vitals:    Vitals:    02/13/23 2029   BP: (!) 167/117   Pulse: 90   Resp: 18   Temp: 98.1 °F (36.7 °C)   SpO2: 97%       MDM:  Patient was seen by me in the intake area following a trip and fall resulting in a head injury. Patient admittedly was intoxicated. Physical exam revealed a pleasant but easily agitated 57-year-old gentleman who answers questions appropriately. He had an abrasion to his left forehead and nose. The patient was neurologically intact.   Vital signs reviewed and interpreted as stable although hypertensive. Patient received wound care and abrasions were cleansed. Tetanus was updated. Patient declined pain medication or oral fluids. CT scans of the head, facial bones, and neck were performed. When I went to recheck the patient he was not in the room. I had found he had eloped the department. There were attempts to find the patient inside and outside of the department that were unsuccessful. CRITICAL CARE:   None    CONSULTS:  None    PROCEDURES:  None    FINAL IMPRESSION      1. Closed head injury, initial encounter    2. Acute alcoholic intoxication without complication ([address]    3. Abrasion of forehead, initial encounter    4. Contusion of nose, initial encounter          DISPOSITION/PLAN     1. Closed head injury, initial encounter    2. Acute alcoholic intoxication without complication ([address]    3. Abrasion of forehead, initial encounter    4.  Contusion of nose, initial encounter      Patient eloped the department    (Please note that portions of this note were completed with a voice recognition program.  Efforts were made to edit the dictations but occasionally words are mis-transcribed.)    Kathlean Landau, PA-C 02/14/23 12:26 AM    Kathlean Landau, PA-C Kathlean Landau, PA-C  02/14/23 0040

## 2023-11-04 ENCOUNTER — HOSPITAL ENCOUNTER (EMERGENCY)
Age: 53
Discharge: LEFT BEFORE DISCHARGE | End: 2023-11-05
Attending: EMERGENCY MEDICINE
Payer: COMMERCIAL

## 2023-11-04 ENCOUNTER — APPOINTMENT (OUTPATIENT)
Dept: CT IMAGING | Age: 53
End: 2023-11-04
Payer: COMMERCIAL
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
## 2023-11-04 DIAGNOSIS — R78.0 ELEVATED BLOOD ALCOHOL LEVEL, BLOOD ALCOHOL LEVEL NOT SPECIFIED: ICD-10-CM

## 2023-11-04 LAB
ANION GAP SERPL CALC-SCNC: 14 MEQ/L (ref 8–16)
BASOPHILS ABSOLUTE: 0.1 THOU/MM3 (ref 0–0.1)
BASOPHILS NFR BLD AUTO: 1.1 %
BUN SERPL-MCNC: 7 MG/DL (ref 7–22)
CALCIUM SERPL-MCNC: 9.4 MG/DL (ref 8.5–10.5)
CHLORIDE SERPL-SCNC: 102 MEQ/L (ref 98–111)
CO2 SERPL-SCNC: 27 MEQ/L (ref 23–33)
CREAT SERPL-MCNC: 0.8 MG/DL (ref 0.4–1.2)
DEPRECATED RDW RBC AUTO: 51.2 FL (ref 35–45)
EOSINOPHIL NFR BLD AUTO: 2.1 %
EOSINOPHILS ABSOLUTE: 0.1 THOU/MM3 (ref 0–0.4)
ERYTHROCYTE [DISTWIDTH] IN BLOOD BY AUTOMATED COUNT: 15.3 % (ref 11.5–14.5)
ETHANOL SERPL-MCNC: 0.45 %
GFR SERPL CREATININE-BSD FRML MDRD: > 60 ML/MIN/1.73M2
GLUCOSE SERPL-MCNC: 100 MG/DL (ref 70–108)
HCT VFR BLD AUTO: 41.7 % (ref 42–52)
HGB BLD-MCNC: 13.4 GM/DL (ref 14–18)
IMM GRANULOCYTES # BLD AUTO: 0 THOU/MM3 (ref 0–0.07)
IMM GRANULOCYTES NFR BLD AUTO: 0 %
LYMPHOCYTES ABSOLUTE: 2.2 THOU/MM3 (ref 1–4.8)
LYMPHOCYTES NFR BLD AUTO: 46.8 %
MCH RBC QN AUTO: 29.3 PG (ref 26–33)
MCHC RBC AUTO-ENTMCNC: 32.1 GM/DL (ref 32.2–35.5)
MCV RBC AUTO: 91.2 FL (ref 80–94)
MONOCYTES ABSOLUTE: 0.4 THOU/MM3 (ref 0.4–1.3)
MONOCYTES NFR BLD AUTO: 9.1 %
NEUTROPHILS NFR BLD AUTO: 40.9 %
NRBC BLD AUTO-RTO: 0 /100 WBC
OSMOLALITY SERPL CALC.SUM OF ELEC: 283 MOSMOL/KG (ref 275–300)
PLATELET # BLD AUTO: 164 THOU/MM3 (ref 130–400)
PMV BLD AUTO: 9.7 FL (ref 9.4–12.4)
POTASSIUM SERPL-SCNC: 4.2 MEQ/L (ref 3.5–5.2)
RBC # BLD AUTO: 4.57 MILL/MM3 (ref 4.7–6.1)
SEGMENTED NEUTROPHILS ABSOLUTE COUNT: 1.9 THOU/MM3 (ref 1.8–7.7)
SODIUM SERPL-SCNC: 143 MEQ/L (ref 135–145)
WBC # BLD AUTO: 4.7 THOU/MM3 (ref 4.8–10.8)

## 2023-11-04 PROCEDURE — 70450 CT HEAD/BRAIN W/O DYE: CPT

## 2023-11-04 PROCEDURE — 72131 CT LUMBAR SPINE W/O DYE: CPT

## 2023-11-04 PROCEDURE — 70486 CT MAXILLOFACIAL W/O DYE: CPT

## 2023-11-04 PROCEDURE — 80307 DRUG TEST PRSMV CHEM ANLYZR: CPT

## 2023-11-04 PROCEDURE — 99284 EMERGENCY DEPT VISIT MOD MDM: CPT

## 2023-11-04 PROCEDURE — 80048 BASIC METABOLIC PNL TOTAL CA: CPT

## 2023-11-04 PROCEDURE — 85025 COMPLETE CBC W/AUTO DIFF WBC: CPT

## 2023-11-04 PROCEDURE — 36415 COLL VENOUS BLD VENIPUNCTURE: CPT

## 2023-11-04 PROCEDURE — 72128 CT CHEST SPINE W/O DYE: CPT

## 2023-11-04 PROCEDURE — 72125 CT NECK SPINE W/O DYE: CPT

## 2023-11-04 PROCEDURE — 82077 ASSAY SPEC XCP UR&BREATH IA: CPT

## 2023-11-05 VITALS
HEART RATE: 66 BPM | OXYGEN SATURATION: 99 % | SYSTOLIC BLOOD PRESSURE: 165 MMHG | RESPIRATION RATE: 17 BRPM | TEMPERATURE: 98.1 F | DIASTOLIC BLOOD PRESSURE: 115 MMHG

## 2023-11-05 LAB
AMPHETAMINES UR QL SCN: NEGATIVE
BARBITURATES UR QL SCN: NEGATIVE
BENZODIAZ UR QL SCN: NEGATIVE
BZE UR QL SCN: NEGATIVE
CANNABINOIDS UR QL SCN: NEGATIVE
FENTANYL: NEGATIVE
OPIATES UR QL SCN: NEGATIVE
OXYCODONE: NEGATIVE
PCP UR QL SCN: NEGATIVE

## 2023-11-05 NOTE — ED NOTES
Pt resting on cot comfortably at this time. No other needs expressed. Respirations even and unlabored.       Dami Mata RN  11/05/23 1017

## 2023-11-05 NOTE — ED PROVIDER NOTES
[facility] DEPT  EMERGENCY DEPARTMENT ENCOUNTER          Pt Name: Willie Corado  MRN: [id number]  [address] [facility] 1970  Date of evaluation: 11/4/2023  Physician: Kae Alford MD  Supervising Attending Physician: Beto Cardoza, [address] [place] [address]       Chief Complaint   Patient presents with    Fall    Alcohol Intoxication         HISTORY OF PRESENT ILLNESS    HPI  Willie Corado is a 52 y.o. male who presents to the emergency department  from a bar , brought in by EMS for evaluation of a fall. It is reported that the patient was at a bar standing up on a stool when he fell forward landing on his face. Patient reports that he did lose consciousness. Patient denies any neck pain however he does report some pain in his middle back. Patient reports that he drinks beer daily. Patient states that he drank more than 5 beers today. Patient denies any chest pain or shortness of breath. Patient denies any nausea or vomiting. Patient denies any change in his vision. Patient denies any numbness or tingling in his extremities  The patient has no other acute complaints at this time. PAST MEDICAL AND SURGICAL HISTORY   History reviewed. No pertinent past medical history. History reviewed. No pertinent surgical history. MEDICATIONS   No current facility-administered medications for this encounter. No current outpatient medications on file. Previous Medications    No medications on file         SOCIAL HISTORY     Social History     Social History Narrative    Not on file     Social History     Tobacco Use    Smoking status: Every Day     Packs/day: 1     Types: Cigarettes    Smokeless tobacco: Never   Substance Use Topics    Alcohol use: Yes     Alcohol/week: 35.0 standard drinks of alcohol     Types: 35 Cans of beer per week    Drug use: Never         ALLERGIES   No Known Allergies      FAMILY HISTORY   History reviewed. No pertinent family history.       PHYSICAL

## 2023-11-05 NOTE — ED NOTES
Pt noted to be intermittently yelling out. Dr. Nikolas Clark at bedside.      Kathy Doherty RN  11/04/23 6989

## 2023-12-27 ENCOUNTER — TELEPHONE (OUTPATIENT)
Dept: PHARMACY | Age: 53
End: 2023-12-27

## 2023-12-27 NOTE — TELEPHONE ENCOUNTER
Referral to [facility] received from Dr. Jose Jerez for Smoking Cessation Counseling and Medication Management per Consult Agreement. Called patient, left message with instructions for patient to call the clinic back at [phone], option 2.

## 2024-12-13 ENCOUNTER — HOSPITAL ENCOUNTER (EMERGENCY)
Age: 54
Discharge: HOME | End: 2024-12-13
Attending: FAMILY MEDICINE
Payer: COMMERCIAL

## 2024-12-13 ENCOUNTER — APPOINTMENT (OUTPATIENT)
Dept: CT IMAGING | Age: 54
End: 2024-12-13
Payer: COMMERCIAL

## 2024-12-13 VITALS
HEART RATE: 59 BPM | RESPIRATION RATE: 18 BRPM | BODY MASS INDEX: 30.2 KG/M2 | WEIGHT: 176 LBS | DIASTOLIC BLOOD PRESSURE: 108 MMHG | SYSTOLIC BLOOD PRESSURE: 177 MMHG | OXYGEN SATURATION: 98 % | TEMPERATURE: 98 F

## 2024-12-13 DIAGNOSIS — K85.90 PSEUDOCYST OF PANCREAS DUE TO ACUTE PANCREATITIS: Primary | ICD-10-CM

## 2024-12-13 DIAGNOSIS — K86.3 PSEUDOCYST OF PANCREAS DUE TO ACUTE PANCREATITIS: Primary | ICD-10-CM

## 2024-12-13 LAB
ALBUMIN SERPL BCG-MCNC: 3.8 G/DL (ref 3.5–5.1)
ALP SERPL-CCNC: 109 U/L (ref 38–126)
ALT SERPL W/O P-5'-P-CCNC: 16 U/L (ref 11–66)
ANION GAP SERPL CALC-SCNC: 10 MEQ/L (ref 8–16)
AST SERPL-CCNC: 20 U/L (ref 5–40)
BASOPHILS ABSOLUTE: 0 THOU/MM3 (ref 0–0.1)
BASOPHILS NFR BLD AUTO: 1 %
BILIRUB CONJ SERPL-MCNC: 0.3 MG/DL (ref 0.1–13.8)
BILIRUB SERPL-MCNC: 0.8 MG/DL (ref 0.3–1.2)
BUN SERPL-MCNC: 9 MG/DL (ref 7–22)
CALCIUM SERPL-MCNC: 9.7 MG/DL (ref 8.5–10.5)
CHLORIDE SERPL-SCNC: 96 MEQ/L (ref 98–111)
CO2 SERPL-SCNC: 28 MEQ/L (ref 23–33)
CREAT SERPL-MCNC: 0.8 MG/DL (ref 0.4–1.2)
DEPRECATED MEAN GLUCOSE BLD GHB EST-ACNC: 111 MG/DL (ref 70–126)
DEPRECATED RDW RBC AUTO: 44.1 FL (ref 35–45)
EKG ATRIAL RATE: 74 BPM
EKG P AXIS: 47 DEGREES
EKG P-R INTERVAL: 146 MS
EKG Q-T INTERVAL: 380 MS
EKG QRS DURATION: 86 MS
EKG QTC CALCULATION (BAZETT): 421 MS
EKG R AXIS: 39 DEGREES
EKG T AXIS: 43 DEGREES
EKG VENTRICULAR RATE: 74 BPM
EOSINOPHIL NFR BLD AUTO: 1.5 %
EOSINOPHILS ABSOLUTE: 0.1 THOU/MM3 (ref 0–0.4)
ERYTHROCYTE [DISTWIDTH] IN BLOOD BY AUTOMATED COUNT: 13.6 % (ref 11.5–14.5)
GFR SERPL CREATININE-BSD FRML MDRD: > 90 ML/MIN/1.73M2
GLUCOSE SERPL-MCNC: 218 MG/DL (ref 70–108)
HBA1C MFR BLD HPLC: 5.7 % (ref 4.4–6.4)
HCT VFR BLD AUTO: 42.1 % (ref 42–52)
HGB BLD-MCNC: 13.6 GM/DL (ref 14–18)
IMM GRANULOCYTES # BLD AUTO: 0.01 THOU/MM3 (ref 0–0.07)
IMM GRANULOCYTES NFR BLD AUTO: 0.2 %
LIPASE SERPL-CCNC: 198.2 U/L (ref 5.6–51.3)
LYMPHOCYTES ABSOLUTE: 1 THOU/MM3 (ref 1–4.8)
LYMPHOCYTES NFR BLD AUTO: 25.6 %
MCH RBC QN AUTO: 28.6 PG (ref 26–33)
MCHC RBC AUTO-ENTMCNC: 32.3 GM/DL (ref 32.2–35.5)
MCV RBC AUTO: 88.4 FL (ref 80–94)
MONOCYTES ABSOLUTE: 0.3 THOU/MM3 (ref 0.4–1.3)
MONOCYTES NFR BLD AUTO: 8.1 %
NEUTROPHILS ABSOLUTE: 2.6 THOU/MM3 (ref 1.8–7.7)
NEUTROPHILS NFR BLD AUTO: 63.6 %
NRBC BLD AUTO-RTO: 0 /100 WBC
OSMOLALITY SERPL CALC.SUM OF ELEC: 273.6 MOSMOL/KG (ref 275–300)
PLATELET # BLD AUTO: 283 THOU/MM3 (ref 130–400)
PMV BLD AUTO: 9.1 FL (ref 9.4–12.4)
POTASSIUM SERPL-SCNC: 4.4 MEQ/L (ref 3.5–5.2)
PROT SERPL-MCNC: 7.5 G/DL (ref 6.1–8)
RBC # BLD AUTO: 4.76 MILL/MM3 (ref 4.7–6.1)
SODIUM SERPL-SCNC: 134 MEQ/L (ref 135–145)
WBC # BLD AUTO: 4.1 THOU/MM3 (ref 4.8–10.8)

## 2024-12-13 PROCEDURE — 83690 ASSAY OF LIPASE: CPT

## 2024-12-13 PROCEDURE — 36415 COLL VENOUS BLD VENIPUNCTURE: CPT

## 2024-12-13 PROCEDURE — 83036 HEMOGLOBIN GLYCOSYLATED A1C: CPT

## 2024-12-13 PROCEDURE — 93010 ELECTROCARDIOGRAM REPORT: CPT | Performed by: NUCLEAR MEDICINE

## 2024-12-13 PROCEDURE — 6370000000 HC RX 637 (ALT 250 FOR IP)

## 2024-12-13 PROCEDURE — 93005 ELECTROCARDIOGRAM TRACING: CPT | Performed by: FAMILY MEDICINE

## 2024-12-13 PROCEDURE — 85025 COMPLETE CBC W/AUTO DIFF WBC: CPT

## 2024-12-13 PROCEDURE — 99285 EMERGENCY DEPT VISIT HI MDM: CPT

## 2024-12-13 PROCEDURE — 6360000004 HC RX CONTRAST MEDICATION: Performed by: FAMILY MEDICINE

## 2024-12-13 PROCEDURE — 74177 CT ABD & PELVIS W/CONTRAST: CPT

## 2024-12-13 PROCEDURE — 80053 COMPREHEN METABOLIC PANEL: CPT

## 2024-12-13 RX ORDER — IOPAMIDOL 755 MG/ML
80 INJECTION, SOLUTION INTRAVASCULAR
Status: COMPLETED | OUTPATIENT
Start: 2024-12-13 | End: 2024-12-13

## 2024-12-13 RX ORDER — HYDROCODONE BITARTRATE AND ACETAMINOPHEN 5; 325 MG/1; MG/1
1 TABLET ORAL EVERY 6 HOURS PRN
Qty: 12 TABLET | Refills: 0 | Status: SHIPPED | OUTPATIENT
Start: 2024-12-13 | End: 2024-12-16

## 2024-12-13 RX ORDER — DICYCLOMINE HYDROCHLORIDE 10 MG/1
10 CAPSULE ORAL ONCE
Status: COMPLETED | OUTPATIENT
Start: 2024-12-13 | End: 2024-12-13

## 2024-12-13 RX ADMIN — LIDOCAINE HYDROCHLORIDE: 20 SOLUTION ORAL at 14:29

## 2024-12-13 RX ADMIN — DICYCLOMINE HYDROCHLORIDE 10 MG: 10 CAPSULE ORAL at 14:29

## 2024-12-13 RX ADMIN — IOPAMIDOL 80 ML: 755 INJECTION, SOLUTION INTRAVENOUS at 15:47

## 2024-12-13 NOTE — DISCHARGE INSTR - COC
Continuity of Care Form    Patient Name: Stone Hull   :  1970  MRN:  [id number]    Admit date:  2024  Discharge date:  ***    Code Status Order: No Order   Advance Directives:   Advance Care Flowsheet Documentation             Admitting Physician:  No admitting provider for patient encounter.  PCP: No primary care provider on file.    Discharging Nurse: ***  Discharging Hospital Unit/Room#:   Discharging Unit Phone Number: ***    Emergency Contact:   Extended Emergency Contact Information  Primary Emergency Contact: Gemini Hull  Home Phone: [phone]  Mobile Phone: [phone]  Relation: Spouse   needed? No    Past Surgical History:  History reviewed. No pertinent surgical history.    Immunization History:   Immunization History   Administered Date(s) Administered    TDaP, ADACEL (age 10y-64y), BOOSTRIX (age 10y+), IM, 0.5mL 2023       Active Problems:  There is no problem list on file for this patient.      Isolation/Infection:   Isolation            No Isolation          Patient Infection Status       None to display            Nurse Assessment:  Last Vital Signs: BP (!) 177/108   Pulse 59   Temp 98 °F (36.7 °C) (Oral)   Resp 18   Wt 79.8 kg (176 lb)   SpO2 98%   BMI 30.20 kg/m²     Last documented pain score (0-10 scale):    Last Weight:   Wt Readings from Last 1 Encounters:   24 79.8 kg (176 lb)     Mental Status:  {IP PT MENTAL STATUS:70300}    IV Access:  { GINO IV ACCESS:129289458}    Nursing Mobility/ADLs:  Walking   {CHP DME ADLs:274289618}  Transfer  {CHP DME ADLs:490390159}  Bathing  {CHP DME ADLs:201839946}  Dressing  {CHP DME ADLs:410215080}  Toileting  {CHP DME ADLs:841170875}  Feeding  {CHP DME ADLs:986654645}  Med Admin  {CHP DME ADLs:621157097}  Med Delivery   { GINO MED Delivery:998856757}    Wound Care Documentation and Therapy:        Elimination:  Continence:   Bowel: {YES / NO:}  Bladder: {YES / NO:}  Urinary Catheter:  {Urinary Catheter:004729887}   Colostomy/Ileostomy/Ileal Conduit: {YES / NO:}       Date of Last BM: ***  No intake or output data in the 24 hours ending 24  No intake/output data recorded.    Safety Concerns:     { GINO Safety Concerns:152018916}    Impairments/Disabilities:      { GINO Impairments/Disabilities:432162477}    Nutrition Therapy:  Current Nutrition Therapy:   { GINO Diet List:192463872}    Routes of Feeding: {[facility] DME Other Feedings:946997326}  Liquids: {Slp liquid thickness:81675}  Daily Fluid Restriction: {[facility] DME Yes amt example:482168717}  Last Modified Barium Swallow with Video (Video Swallowing Test): {Done Not Done Date:}    Treatments at the Time of Hospital Discharge:   Respiratory Treatments: ***  Oxygen Therapy:  {Therapy; copd oxygen:43261}  Ventilator:    { CC Vent List:696674786}    Rehab Therapies: {THERAPEUTIC INTERVENTION:7521016658}  Weight Bearing Status/Restrictions: {[facility] Weight Bearin}  Other Medical Equipment (for information only, NOT a DME order):  {EQUIPMENT:246991628}  Other Treatments: ***    Patient's personal belongings (please select all that are sent with patient):  {[facility] DME Belongings:000932149}    RN SIGNATURE:  {Esignature:451925296}    CASE MANAGEMENT/SOCIAL WORK SECTION    Inpatient Status Date: ***    Readmission Risk Assessment Score:  [facility] RISK OF UNPLANNED READMISSION 2.0             0 Total Score        Discharging to Facility/ Agency   Name:   Address:  Phone:  Fax:    Dialysis Facility (if applicable)   Name:  Address:  Dialysis Schedule:  Phone:  Fax:    / signature: {Esignature:542584713}    PHYSICIAN SECTION    Prognosis: {Prognosis:2454562071}    Condition at Discharge: { Patient Condition:883340025}    Rehab Potential (if transferring to Rehab): {Prognosis:5357388537}    Recommended Labs or Other Treatments After Discharge: ***    Physician Certification: I certify the above information

## 2024-12-13 NOTE — DISCHARGE INSTRUCTIONS
DO not drink anything that has alcohol in it.  Avoid eating any spicy food, milk type products or drinks that have caffeine in it.  Take all medications as prescribed.  For pain use acetaminophen (Tylenol) or ibuprofen (Motrin / Advil), unless prescribed medications that have acetaminophen or ibuprofen (or similar medications) in it.  You can take over the counter acetaminophen tablets (1 - 2 tablets of the 500-mg strength every 6 hours) or ibuprofen tablets (2 tablets every 4 hours).    PLEASE RETURN TO THE EMERGENCY DEPARTMENT IMMEDIATELY for worsening symptoms, throwing up blood, black stools, or if you develop any concerning symptoms such as: high fever not relieved by acetaminophen (Tylenol) and/or ibuprofen (Motrin / Advil), chills, shortness of breath, chest pain, feeling of your heart fluttering or racing, persistent nausea and/or vomiting, vomiting up blood, blood in your stool, loss of consciousness, numbness, weakness or tingling in the arms or legs or change in color of the extremities, changes in mental status, persistent headache, blurry vision loss of bladder / bowel control, unable to follow up with your physician, or other any other care or concern.

## 2024-12-13 NOTE — ED PROVIDER NOTES
[facility] EMERGENCY DEPARTMENT    EMERGENCY MEDICINE     Patient Name: Stone Hull  MRN: [id number]  YOB: 1970  Date of Evaluation: 12/13/2024  Treating Resident Physician: Juanita Jaquez DO  Supervising Physician: Dr. Phani Colindres MD    CHIEF COMPLAINT       Chief Complaint   Patient presents with    Abdominal Pain       HISTORY OF PRESENT ILLNESS      History obtained from the patient.    Stone Hull is a 54 y.o. male who presents to the emergency department from home by private vehicle for evaluation of abdominal pain.   For the past week patient's been having general abdominal pain.  He reports sometimes it is positional worsened with laying flat but sometimes it is random.  He has had no prior abdominal surgeries.  No nausea or vomiting.  No fevers.  He is tolerating oral food.  No diarrhea.  No urinary urgency or frequency.  No history of STDs no penile discharge.  No history of hepatitis.  No alcohol use.  Smokes tobacco no other illicit drug use.  Denied any chest pain or shortness of breath.     Pertinent previous and/or external records reviewed: Non-contributory    PAST MEDICAL AND SURGICAL HISTORY     Past Medical History:   Diagnosis Date    Hypertension        History reviewed. No pertinent surgical history.    CURRENT MEDICATIONS     Previous Medications    BLOOD PRESSURE KIT    1 kit by Does not apply route daily    DOXAZOSIN (CARDURA) 2 MG TABLET    Take 1 tablet by mouth daily       ALLERGIES   No Known Allergies    FAMILY HISTORY     Family History   Problem Relation Age of Onset    Hypertension Mother     Stroke Sister     Kidney Disease Brother        SOCIAL HISTORY     Social History     Tobacco Use    Smoking status: Every Day     Current packs/day: 0.50     Average packs/day: 0.5 packs/day for 26.0 years (13.0 ttl pk-yrs)     Types: Cigarettes     Start date: 12/20/1998    Smokeless tobacco: Never   Substance Use Topics    Alcohol use: Yes     Alcohol/week: 35.0 standard  fecal material seen throughout the   colon suggesting constipation. No bowel obstruction.      4. Bladder wall thickening appears out of proportion to the degree of under   distention. Correlate with urinalysis for urinary tract infection. No renal   calculus or obstructive uropathy is visualized.            **This report has been created using voice recognition software.  It may contain   minor errors which are inherent in voice recognition technology.**      Electronically signed by Dr. Ángela Cooper          LABS: (none if blank)  Labs Reviewed   CBC WITH AUTO DIFFERENTIAL - Abnormal; Notable for the following components:       Result Value    WBC 4.1 (*)     Hemoglobin 13.6 (*)     MPV 9.1 (*)     Monocytes Absolute 0.3 (*)     All other components within normal limits   COMPREHENSIVE METABOLIC PANEL W/ REFLEX TO MG FOR LOW K - Abnormal; Notable for the following components:    Glucose 218 (*)     Sodium 134 (*)     Chloride 96 (*)     All other components within normal limits   LIPASE - Abnormal; Notable for the following components:    Lipase 198.2 (*)     All other components within normal limits   OSMOLALITY - Abnormal; Notable for the following components:    Osmolality Calc 273.6 (*)     All other components within normal limits   HEPATIC FUNCTION PANEL   ANION GAP   GLOMERULAR FILTRATION RATE, ESTIMATED   URINALYSIS WITH REFLEX TO CULTURE   HEMOGLOBIN A1C     (A negative COVID-19 test should be interpreted as COVID no longer suspected unless otherwise noted in this encounter documentation note)  (Any cultures that may have been sent were not resulted at the time of this patient ED visit)    MEDICAL DECISION MAKING     Initial Differential: Includes but is not limited to cholecystitis, cholelithiasis, pancreatitis, GERD, abdominal mass    MDM/Assessment     Medical Decision Making   54-year-old male with no significant PMH presents with 1 week of abdominal pain.  Physical exam significant for epigastric

## 2024-12-13 NOTE — ED NOTES
Pt to the ED via personal  transport. Pt presents with complaints of abdominal pain . Patient states that symptoms began about a week ago. EKG completed and IV access obtained. Telemetry applied. Patient is alert and oriented x 4. Respirations are regular and unlabored. Patient provided blanket.  Call light within reach.

## 2025-02-17 ENCOUNTER — APPOINTMENT (OUTPATIENT)
Dept: CT IMAGING | Age: 55
DRG: 439 | End: 2025-02-17
Payer: COMMERCIAL

## 2025-02-17 ENCOUNTER — HOSPITAL ENCOUNTER (INPATIENT)
Age: 55
LOS: 2 days | Discharge: HOME | DRG: 439 | End: 2025-02-20
Attending: EMERGENCY MEDICINE | Admitting: INTERNAL MEDICINE
Payer: COMMERCIAL

## 2025-02-17 ENCOUNTER — APPOINTMENT (OUTPATIENT)
Dept: ULTRASOUND IMAGING | Age: 55
DRG: 439 | End: 2025-02-17
Payer: COMMERCIAL

## 2025-02-17 DIAGNOSIS — K85.90 ACUTE PANCREATITIS, UNSPECIFIED COMPLICATION STATUS, UNSPECIFIED PANCREATITIS TYPE: ICD-10-CM

## 2025-02-17 DIAGNOSIS — K85.20 ALCOHOL-INDUCED ACUTE PANCREATITIS WITHOUT INFECTION OR NECROSIS: Primary | ICD-10-CM

## 2025-02-17 LAB
ALBUMIN SERPL BCG-MCNC: 4.5 G/DL (ref 3.5–5.1)
ALP SERPL-CCNC: 118 U/L (ref 38–126)
ALT SERPL W/O P-5'-P-CCNC: 18 U/L (ref 11–66)
ANION GAP SERPL CALC-SCNC: 20 MEQ/L (ref 8–16)
AST SERPL-CCNC: 32 U/L (ref 5–40)
BASOPHILS ABSOLUTE: 0 THOU/MM3 (ref 0–0.1)
BASOPHILS NFR BLD AUTO: 0.6 %
BILIRUB CONJ SERPL-MCNC: 0.5 MG/DL (ref 0–0.3)
BILIRUB SERPL-MCNC: 1.9 MG/DL (ref 0.3–1.2)
BUN SERPL-MCNC: 10 MG/DL (ref 7–22)
CA-I BLD ISE-SCNC: 1.25 MMOL/L (ref 1.12–1.32)
CALCIUM SERPL-MCNC: 9.8 MG/DL (ref 8.5–10.5)
CHLORIDE SERPL-SCNC: 94 MEQ/L (ref 98–111)
CHOLEST SERPL-MCNC: 197 MG/DL (ref 100–199)
CO2 SERPL-SCNC: 26 MEQ/L (ref 23–33)
CREAT SERPL-MCNC: 0.8 MG/DL (ref 0.4–1.2)
CRP SERPL-MCNC: 0.9 MG/DL (ref 0–1)
DEPRECATED MEAN GLUCOSE BLD GHB EST-ACNC: 120 MG/DL (ref 70–126)
DEPRECATED RDW RBC AUTO: 48.9 FL (ref 35–45)
EKG ATRIAL RATE: 55 BPM
EKG P AXIS: 49 DEGREES
EKG P-R INTERVAL: 150 MS
EKG Q-T INTERVAL: 496 MS
EKG QRS DURATION: 100 MS
EKG QTC CALCULATION (BAZETT): 474 MS
EKG R AXIS: 45 DEGREES
EKG T AXIS: -3 DEGREES
EKG VENTRICULAR RATE: 55 BPM
EOSINOPHIL NFR BLD AUTO: 0.2 %
EOSINOPHILS ABSOLUTE: 0 THOU/MM3 (ref 0–0.4)
ERYTHROCYTE [DISTWIDTH] IN BLOOD BY AUTOMATED COUNT: 15.7 % (ref 11.5–14.5)
ETHANOL SERPL-MCNC: < 0.01 % (ref 0–0.08)
FLUAV RNA RESP QL NAA+PROBE: NOT DETECTED
FLUBV RNA RESP QL NAA+PROBE: NOT DETECTED
GFR SERPL CREATININE-BSD FRML MDRD: > 90 ML/MIN/1.73M2
GLUCOSE SERPL-MCNC: 143 MG/DL (ref 70–108)
HBA1C MFR BLD HPLC: 6 % (ref 4.4–6.4)
HCT VFR BLD AUTO: 41.8 % (ref 42–52)
HDLC SERPL-MCNC: 93 MG/DL
HGB BLD-MCNC: 13.5 GM/DL (ref 14–18)
IMM GRANULOCYTES # BLD AUTO: 0.02 THOU/MM3 (ref 0–0.07)
IMM GRANULOCYTES NFR BLD AUTO: 0.4 %
LACTATE SERPL-SCNC: 0.9 MMOL/L (ref 0.5–2)
LDH SERPL L TO P-CCNC: 247 U/L (ref 100–190)
LDLC SERPL CALC-MCNC: 92 MG/DL
LIPASE SERPL-CCNC: 572.7 U/L (ref 5.6–51.3)
LYMPHOCYTES ABSOLUTE: 0.5 THOU/MM3 (ref 1–4.8)
LYMPHOCYTES NFR BLD AUTO: 9.9 %
MAGNESIUM SERPL-MCNC: 2.2 MG/DL (ref 1.6–2.4)
MCH RBC QN AUTO: 27.8 PG (ref 26–33)
MCHC RBC AUTO-ENTMCNC: 32.3 GM/DL (ref 32.2–35.5)
MCV RBC AUTO: 86.2 FL (ref 80–94)
MONOCYTES ABSOLUTE: 0.4 THOU/MM3 (ref 0.4–1.3)
MONOCYTES NFR BLD AUTO: 7.4 %
NEUTROPHILS ABSOLUTE: 4.3 THOU/MM3 (ref 1.8–7.7)
NEUTROPHILS NFR BLD AUTO: 81.5 %
NRBC BLD AUTO-RTO: 0 /100 WBC
OSMOLALITY SERPL CALC.SUM OF ELEC: 280.9 MOSMOL/KG (ref 275–300)
PHOSPHATE SERPL-MCNC: 3.9 MG/DL (ref 2.4–4.7)
PLATELET # BLD AUTO: 212 THOU/MM3 (ref 130–400)
PMV BLD AUTO: 8.4 FL (ref 9.4–12.4)
POTASSIUM SERPL-SCNC: 4.2 MEQ/L (ref 3.5–5.2)
PROCALCITONIN SERPL IA-MCNC: 0.07 NG/ML (ref 0.01–0.09)
PROT SERPL-MCNC: 7.7 G/DL (ref 6.1–8)
RBC # BLD AUTO: 4.85 MILL/MM3 (ref 4.7–6.1)
SARS-COV-2 RNA RESP QL NAA+PROBE: NOT DETECTED
SODIUM SERPL-SCNC: 140 MEQ/L (ref 135–145)
TRIGL SERPL-MCNC: 62 MG/DL (ref 0–199)
TROPONIN, HIGH SENSITIVITY: 22 NG/L (ref 0–12)
TROPONIN, HIGH SENSITIVITY: 23 NG/L (ref 0–12)
WBC # BLD AUTO: 5.3 THOU/MM3 (ref 4.8–10.8)

## 2025-02-17 PROCEDURE — 93005 ELECTROCARDIOGRAM TRACING: CPT | Performed by: EMERGENCY MEDICINE

## 2025-02-17 PROCEDURE — 96366 THER/PROPH/DIAG IV INF ADDON: CPT

## 2025-02-17 PROCEDURE — 99223 1ST HOSP IP/OBS HIGH 75: CPT | Performed by: STUDENT IN AN ORGANIZED HEALTH CARE EDUCATION/TRAINING PROGRAM

## 2025-02-17 PROCEDURE — G0378 HOSPITAL OBSERVATION PER HR: HCPCS

## 2025-02-17 PROCEDURE — 96376 TX/PRO/DX INJ SAME DRUG ADON: CPT

## 2025-02-17 PROCEDURE — 80053 COMPREHEN METABOLIC PANEL: CPT

## 2025-02-17 PROCEDURE — 84100 ASSAY OF PHOSPHORUS: CPT

## 2025-02-17 PROCEDURE — 85025 COMPLETE CBC W/AUTO DIFF WBC: CPT

## 2025-02-17 PROCEDURE — 83036 HEMOGLOBIN GLYCOSYLATED A1C: CPT

## 2025-02-17 PROCEDURE — 74177 CT ABD & PELVIS W/CONTRAST: CPT

## 2025-02-17 PROCEDURE — 96375 TX/PRO/DX INJ NEW DRUG ADDON: CPT

## 2025-02-17 PROCEDURE — 83615 LACTATE (LD) (LDH) ENZYME: CPT

## 2025-02-17 PROCEDURE — 96368 THER/DIAG CONCURRENT INF: CPT

## 2025-02-17 PROCEDURE — 86140 C-REACTIVE PROTEIN: CPT

## 2025-02-17 PROCEDURE — 99285 EMERGENCY DEPT VISIT HI MDM: CPT

## 2025-02-17 PROCEDURE — 80061 LIPID PANEL: CPT

## 2025-02-17 PROCEDURE — 6360000002 HC RX W HCPCS

## 2025-02-17 PROCEDURE — 93010 ELECTROCARDIOGRAM REPORT: CPT | Performed by: INTERNAL MEDICINE

## 2025-02-17 PROCEDURE — 96365 THER/PROPH/DIAG IV INF INIT: CPT

## 2025-02-17 PROCEDURE — 83735 ASSAY OF MAGNESIUM: CPT

## 2025-02-17 PROCEDURE — 6370000000 HC RX 637 (ALT 250 FOR IP)

## 2025-02-17 PROCEDURE — 36415 COLL VENOUS BLD VENIPUNCTURE: CPT

## 2025-02-17 PROCEDURE — 83690 ASSAY OF LIPASE: CPT

## 2025-02-17 PROCEDURE — 84484 ASSAY OF TROPONIN QUANT: CPT

## 2025-02-17 PROCEDURE — 2580000003 HC RX 258

## 2025-02-17 PROCEDURE — 87636 SARSCOV2 & INF A&B AMP PRB: CPT

## 2025-02-17 PROCEDURE — 96361 HYDRATE IV INFUSION ADD-ON: CPT

## 2025-02-17 PROCEDURE — 84145 PROCALCITONIN (PCT): CPT

## 2025-02-17 PROCEDURE — 83605 ASSAY OF LACTIC ACID: CPT

## 2025-02-17 PROCEDURE — 6360000004 HC RX CONTRAST MEDICATION

## 2025-02-17 PROCEDURE — 96374 THER/PROPH/DIAG INJ IV PUSH: CPT

## 2025-02-17 PROCEDURE — 82077 ASSAY SPEC XCP UR&BREATH IA: CPT

## 2025-02-17 PROCEDURE — 76705 ECHO EXAM OF ABDOMEN: CPT

## 2025-02-17 PROCEDURE — 82330 ASSAY OF CALCIUM: CPT

## 2025-02-17 PROCEDURE — 82248 BILIRUBIN DIRECT: CPT

## 2025-02-17 RX ORDER — ENOXAPARIN SODIUM 100 MG/ML
40 INJECTION SUBCUTANEOUS DAILY
Status: DISCONTINUED | OUTPATIENT
Start: 2025-02-17 | End: 2025-02-20 | Stop reason: HOSPADM

## 2025-02-17 RX ORDER — ONDANSETRON 2 MG/ML
4 INJECTION INTRAMUSCULAR; INTRAVENOUS ONCE
Status: COMPLETED | OUTPATIENT
Start: 2025-02-17 | End: 2025-02-17

## 2025-02-17 RX ORDER — MULTIVITAMIN WITH IRON
1 TABLET ORAL DAILY
Status: DISCONTINUED | OUTPATIENT
Start: 2025-02-17 | End: 2025-02-20 | Stop reason: HOSPADM

## 2025-02-17 RX ORDER — METRONIDAZOLE 500 MG/100ML
500 INJECTION, SOLUTION INTRAVENOUS EVERY 8 HOURS
Status: DISCONTINUED | OUTPATIENT
Start: 2025-02-17 | End: 2025-02-18

## 2025-02-17 RX ORDER — LORAZEPAM 2 MG/ML
1 INJECTION INTRAMUSCULAR
Status: DISCONTINUED | OUTPATIENT
Start: 2025-02-17 | End: 2025-02-20 | Stop reason: HOSPADM

## 2025-02-17 RX ORDER — IOPAMIDOL 755 MG/ML
80 INJECTION, SOLUTION INTRAVASCULAR
Status: COMPLETED | OUTPATIENT
Start: 2025-02-17 | End: 2025-02-17

## 2025-02-17 RX ORDER — ENOXAPARIN SODIUM 100 MG/ML
40 INJECTION SUBCUTANEOUS DAILY
Status: DISCONTINUED | OUTPATIENT
Start: 2025-02-17 | End: 2025-02-17 | Stop reason: SDUPTHER

## 2025-02-17 RX ORDER — LANOLIN ALCOHOL/MO/W.PET/CERES
100 CREAM (GRAM) TOPICAL DAILY
Status: DISCONTINUED | OUTPATIENT
Start: 2025-02-17 | End: 2025-02-20 | Stop reason: HOSPADM

## 2025-02-17 RX ORDER — SODIUM CHLORIDE, SODIUM LACTATE, POTASSIUM CHLORIDE, CALCIUM CHLORIDE 600; 310; 30; 20 MG/100ML; MG/100ML; MG/100ML; MG/100ML
INJECTION, SOLUTION INTRAVENOUS CONTINUOUS
Status: DISCONTINUED | OUTPATIENT
Start: 2025-02-17 | End: 2025-02-20 | Stop reason: HOSPADM

## 2025-02-17 RX ORDER — ONDANSETRON 4 MG/1
4 TABLET, ORALLY DISINTEGRATING ORAL EVERY 8 HOURS PRN
Status: DISCONTINUED | OUTPATIENT
Start: 2025-02-17 | End: 2025-02-20 | Stop reason: HOSPADM

## 2025-02-17 RX ORDER — POTASSIUM CHLORIDE 29.8 MG/ML
20 INJECTION INTRAVENOUS PRN
Status: DISCONTINUED | OUTPATIENT
Start: 2025-02-17 | End: 2025-02-20 | Stop reason: HOSPADM

## 2025-02-17 RX ORDER — LOSARTAN POTASSIUM 50 MG/1
50 TABLET ORAL DAILY
Status: DISCONTINUED | OUTPATIENT
Start: 2025-02-17 | End: 2025-02-20 | Stop reason: HOSPADM

## 2025-02-17 RX ORDER — 0.9 % SODIUM CHLORIDE 0.9 %
1000 INTRAVENOUS SOLUTION INTRAVENOUS ONCE
Status: COMPLETED | OUTPATIENT
Start: 2025-02-17 | End: 2025-02-17

## 2025-02-17 RX ORDER — CIPROFLOXACIN 2 MG/ML
400 INJECTION, SOLUTION INTRAVENOUS EVERY 12 HOURS
Status: DISCONTINUED | OUTPATIENT
Start: 2025-02-17 | End: 2025-02-19

## 2025-02-17 RX ORDER — LORAZEPAM 1 MG/1
1 TABLET ORAL
Status: DISCONTINUED | OUTPATIENT
Start: 2025-02-17 | End: 2025-02-20 | Stop reason: HOSPADM

## 2025-02-17 RX ORDER — LORAZEPAM 2 MG/1
2 TABLET ORAL
Status: DISCONTINUED | OUTPATIENT
Start: 2025-02-17 | End: 2025-02-20 | Stop reason: HOSPADM

## 2025-02-17 RX ORDER — AMLODIPINE BESYLATE 10 MG/1
10 TABLET ORAL DAILY
Status: DISCONTINUED | OUTPATIENT
Start: 2025-02-17 | End: 2025-02-20 | Stop reason: HOSPADM

## 2025-02-17 RX ORDER — MORPHINE SULFATE 4 MG/ML
4 INJECTION, SOLUTION INTRAMUSCULAR; INTRAVENOUS
Status: COMPLETED | OUTPATIENT
Start: 2025-02-17 | End: 2025-02-17

## 2025-02-17 RX ORDER — POTASSIUM CHLORIDE 7.45 MG/ML
10 INJECTION INTRAVENOUS PRN
Status: DISCONTINUED | OUTPATIENT
Start: 2025-02-17 | End: 2025-02-20 | Stop reason: HOSPADM

## 2025-02-17 RX ORDER — MAGNESIUM SULFATE IN WATER 40 MG/ML
2000 INJECTION, SOLUTION INTRAVENOUS PRN
Status: DISCONTINUED | OUTPATIENT
Start: 2025-02-17 | End: 2025-02-20 | Stop reason: HOSPADM

## 2025-02-17 RX ORDER — LORAZEPAM 2 MG/ML
2 INJECTION INTRAMUSCULAR
Status: DISCONTINUED | OUTPATIENT
Start: 2025-02-17 | End: 2025-02-20 | Stop reason: HOSPADM

## 2025-02-17 RX ORDER — HYDRALAZINE HYDROCHLORIDE 20 MG/ML
10 INJECTION INTRAMUSCULAR; INTRAVENOUS EVERY 6 HOURS PRN
Status: DISCONTINUED | OUTPATIENT
Start: 2025-02-17 | End: 2025-02-20 | Stop reason: HOSPADM

## 2025-02-17 RX ORDER — LORAZEPAM 2 MG/1
4 TABLET ORAL
Status: DISCONTINUED | OUTPATIENT
Start: 2025-02-17 | End: 2025-02-20 | Stop reason: HOSPADM

## 2025-02-17 RX ORDER — ONDANSETRON 2 MG/ML
4 INJECTION INTRAMUSCULAR; INTRAVENOUS EVERY 6 HOURS PRN
Status: DISCONTINUED | OUTPATIENT
Start: 2025-02-17 | End: 2025-02-20 | Stop reason: HOSPADM

## 2025-02-17 RX ORDER — FOLIC ACID 1 MG/1
1 TABLET ORAL DAILY
Status: DISCONTINUED | OUTPATIENT
Start: 2025-02-17 | End: 2025-02-20 | Stop reason: HOSPADM

## 2025-02-17 RX ORDER — LORAZEPAM 2 MG/ML
4 INJECTION INTRAMUSCULAR
Status: DISCONTINUED | OUTPATIENT
Start: 2025-02-17 | End: 2025-02-20 | Stop reason: HOSPADM

## 2025-02-17 RX ORDER — LORAZEPAM 2 MG/ML
3 INJECTION INTRAMUSCULAR
Status: DISCONTINUED | OUTPATIENT
Start: 2025-02-17 | End: 2025-02-20 | Stop reason: HOSPADM

## 2025-02-17 RX ADMIN — ONDANSETRON 4 MG: 2 INJECTION, SOLUTION INTRAMUSCULAR; INTRAVENOUS at 10:51

## 2025-02-17 RX ADMIN — METRONIDAZOLE 500 MG: 500 INJECTION, SOLUTION INTRAVENOUS at 18:22

## 2025-02-17 RX ADMIN — SODIUM CHLORIDE, SODIUM LACTATE, POTASSIUM CHLORIDE, AND CALCIUM CHLORIDE: .6; .31; .03; .02 INJECTION, SOLUTION INTRAVENOUS at 23:00

## 2025-02-17 RX ADMIN — HYDROMORPHONE HYDROCHLORIDE 0.5 MG: 1 INJECTION, SOLUTION INTRAMUSCULAR; INTRAVENOUS; SUBCUTANEOUS at 22:59

## 2025-02-17 RX ADMIN — Medication 100 MG: at 14:17

## 2025-02-17 RX ADMIN — AMLODIPINE BESYLATE 10 MG: 10 TABLET ORAL at 14:17

## 2025-02-17 RX ADMIN — CIPROFLOXACIN 400 MG: 400 INJECTION, SOLUTION INTRAVENOUS at 18:19

## 2025-02-17 RX ADMIN — FOLIC ACID 1 MG: 1 TABLET ORAL at 14:17

## 2025-02-17 RX ADMIN — IOPAMIDOL 80 ML: 755 INJECTION, SOLUTION INTRAVENOUS at 11:39

## 2025-02-17 RX ADMIN — Medication 1 TABLET: at 14:17

## 2025-02-17 RX ADMIN — HYDRALAZINE HYDROCHLORIDE 10 MG: 20 INJECTION INTRAMUSCULAR; INTRAVENOUS at 19:55

## 2025-02-17 RX ADMIN — SODIUM CHLORIDE 1000 ML: 9 INJECTION, SOLUTION INTRAVENOUS at 12:55

## 2025-02-17 RX ADMIN — HYDROMORPHONE HYDROCHLORIDE 1 MG: 1 INJECTION, SOLUTION INTRAMUSCULAR; INTRAVENOUS; SUBCUTANEOUS at 12:56

## 2025-02-17 RX ADMIN — MORPHINE SULFATE 4 MG: 4 INJECTION, SOLUTION INTRAMUSCULAR; INTRAVENOUS at 10:51

## 2025-02-17 RX ADMIN — LOSARTAN POTASSIUM 50 MG: 50 TABLET, FILM COATED ORAL at 14:17

## 2025-02-17 RX ADMIN — SODIUM CHLORIDE, SODIUM LACTATE, POTASSIUM CHLORIDE, AND CALCIUM CHLORIDE: .6; .31; .03; .02 INJECTION, SOLUTION INTRAVENOUS at 15:42

## 2025-02-17 ASSESSMENT — PAIN SCALES - WONG BAKER: WONGBAKER_NUMERICALRESPONSE: NO HURT

## 2025-02-17 ASSESSMENT — PAIN DESCRIPTION - PAIN TYPE: TYPE: ACUTE PAIN

## 2025-02-17 ASSESSMENT — PAIN SCALES - GENERAL
PAINLEVEL_OUTOF10: 8
PAINLEVEL_OUTOF10: 10
PAINLEVEL_OUTOF10: 8
PAINLEVEL_OUTOF10: 7
PAINLEVEL_OUTOF10: 0
PAINLEVEL_OUTOF10: 0
PAINLEVEL_OUTOF10: 8

## 2025-02-17 ASSESSMENT — PAIN DESCRIPTION - LOCATION
LOCATION: ABDOMEN
LOCATION: ABDOMEN

## 2025-02-17 ASSESSMENT — PAIN - FUNCTIONAL ASSESSMENT: PAIN_FUNCTIONAL_ASSESSMENT: 0-10

## 2025-02-17 NOTE — H&P
History & Physical  Internal Medicine Resident         Patient: Stone Hull 54 y.o. male      : 1970  Date of Admission: 2025  Date of Service: Pt seen/examined on 25 and Admitted to Observation with expected LOS less than two midnights due to medical therapy.     ASSESSMENT AND PLAN    Acute Pancreatitis w/ pseudocyst: Suspect EtOH-induced.   Pt presented to ED for 1x week mid-epigastric pain worsening daily, unable to tolerate PO since . Significant N/V. Pt states last drink ~3 days ago.   Lipase 572.7.   EtOH: <0.01. Lipid panel TG 2. LFT: Alk phos 118, ALT 18, AST 32.  Total bili 1.9, direct bili 0.5.  CRP 0.9, procal 0.07,   CT AP: Fat stranding and inflammation surrounding the body and tail of the pancreas suggests acute pancreatitis. Multiple cystic lesions within the body of the pancreas and a large cystic collection of fluid at the saturnino hepatis measuring up to 7.8 x 5.0 cm suggesting pseudocyst.   Received 1L fluid bolus in ED.   - NPO, recommend early enteral feeding as soon as tolerated.   - Continue on  mL/hr.  - Will start on IV flagyl.   - Pain meds ordered.   - RUQ US ordered.     Mild alcohol use disorder:   Regularly drinks 4 beers cans per day, last drink 3 days ago per patient.  EtOH: <0.01. Pt denies Hx of withdrawal, DT, or seizures.   - CIWA protocol ordered with PRN ativan.   - On IVF as above.   - Fall and seizure precautions ordered.     Suspected type II MI:  Patient denies CP.  EKG: Sinus bradycardia with HR 55.  No significant ST changes or TWI.  Troponin X2: 23+22.  - If CP occurs, repeat troponin and obtain EKG.    Hypertensive Emergency:  Pt presented to ED with BP of 210/119. Non-compliant with medication at home, does not take any.   - Will resume his previous home medication of Norvasc and ARB.   - PRN anti-hypertensive ordered.   - PRN pain medication as pt is in significant pain.     Data reviewed (unless otherwise discussed in

## 2025-02-17 NOTE — ED PROVIDER NOTES
[facility]  EMERGENCY MEDICINE ATTENDING ATTESTATION      Evaluation of Stone Hull.   Case discussed and care plan developed with resident physician.   I agree with the resident physician documentation and plan as documented by him, except if my documentation differs.   Patient seen, interviewed and examined by me.  I reviewed the medical, surgical, family and social history, medications and allergies.   I have reviewed and interpreted all available lab, radiology and ekg results available at the moment.  I have reviewed the nursing documentation.     Please see the resident physician completed note for final disposition except as documented on this attestation.   I have reviewed and interpreted all available lab, radiology and ekg results available at the moment.  Diagnosis, treatment and disposition plans were discussed and agreed upon by patient.   This transcription was electronically signed. It was dictated by use of voice recognition software and electronically transcribed. The transcription may contain errors not detected in proofreading.     I performed direct supervision and was present for the critical portion following procedures: None  Critical care time on this case: None    Electronically signed by Wu Emmanuel MD on 2/17/25 at 11:34 AM Wu Nunez MD  02/17/25 [id number]

## 2025-02-17 NOTE — ED PROVIDER NOTES
[facility] [place]GENCY [place]  EMERGENCY DEPARTMENT ENCOUNTER          Pt Name: Stone Hull  MRN: [id number]  Birthdate 1970  Date of evaluation: 2/17/2025  Physician: Sarita Iniguez MD  Supervising Attending Physician: Wu Emmanuel MD       CHIEF COMPLAINT       Chief Complaint   Patient presents with    Abdominal Pain         HISTORY OF PRESENT ILLNESS    HPI  Stone Hull is a 54 y.o. male who presents to the emergency department from home, as a walk in to the ED lob for evaluation of abdominal pain    Patient presents to the ED with complaints of epigastric abdominal pain that started this morning.  Noted that the pain is generalized however it is concentrated in the epigastric region not associated with nausea vomiting diarrhea constipation fever chills.  Patient drinks beer almost daily has been drinking for the last couple of days denies any NSAID use but did report he is an active smoker.  He was previously diagnosed with pancreatitis and pancreatic pseudocyst but has not sought any management for it.  The patient has no other acute complaints at this time.      REVIEW OF SYSTEMS   Review of Systems      PAST MEDICAL AND SURGICAL HISTORY     Past Medical History:   Diagnosis Date    Hypertension      No past surgical history on file.      MEDICATIONS     Current Facility-Administered Medications:     sodium chloride 0.9 % bolus 1,000 mL, 1,000 mL, IntraVENous, Once, Sarita Iniguez MD    HYDROmorphone (DILAUDID) injection 1 mg, 1 mg, IntraVENous, NOW, Sarita Iniguez MD    Current Outpatient Medications:     doxazosin (CARDURA) 2 MG tablet, Take 1 tablet by mouth daily, Disp: 30 tablet, Rfl: 0    Blood Pressure KIT, 1 kit by Does not apply route daily, Disp: 1 kit, Rfl: 0    Previous Medications    BLOOD PRESSURE KIT    1 kit by Does not apply route daily    DOXAZOSIN (CARDURA) 2 MG TABLET    Take 1 tablet by mouth daily         SOCIAL HISTORY     Social History     Social  individually reviewed and interpreted by me in the clinical context of this patient.  See ED course below for results interpretation if applicable.  (A negative COVID-19 test should be interpreted as COVID no longer suspected unless otherwise noted in this encounter documentation note)  (Any cultures that may have been sent were not resulted at the time of this patient ED visit)      Radiologic studies results available at the moment of this note (None if blank):  CT ABDOMEN PELVIS W IV CONTRAST Additional Contrast? None   Final Result   1. Fat stranding and inflammation surrounding the body and tail of the pancreas   suggests acute pancreatitis in the appropriate clinical setting. The previously   seen pancreatic cyst near the tail of the pancreas is no longer visualized   however there are multiple cystic lesions within the body of the pancreas and a   large cystic collection of fluid at the saturnino hepatis measuring up to 7.8 x 5.0   cm suggesting pseudocyst. Close clinical follow-up and to ensure resolution is   advised.      2. Chronic findings are discussed.            **This report has been created using voice recognition software.  It may contain   minor errors which are inherent in voice recognition technology.**      Electronically signed by Dr. Ángela Cooper        See ED course below for my interpretation if applicable.  All radiology images independently reviewed by me in the clinical context of this patient, in addition to interpretation provided by the radiologist.      EKG interpretation (none if blank):  Sinus bradycardia rate of 55, suspicious for LVH rate of 55, QTc 474  All EKG results are individually reviewed and interpreted by me in the clinical context of this patient.  All EKGs are also interpreted by our Cardiology department, final interpretation may not be available as of the writing of this note.      PREVIOUS RECORDS  AND EXTERNAL INFORMATION REVIEWED   History obtained from: the

## 2025-02-17 NOTE — ED NOTES
ED to inpatient nurses report      Chief Complaint:  Chief Complaint   Patient presents with    Abdominal Pain     Present to ED from: Home    MOA:     LOC: alert and orientated to name, place, date  Mobility: Independent  Oxygen Baseline: Room Air    Current needs required: Room Air     Code Status:   Full Code    What abnormal results were found and what did you give/do to treat them? Abdominal Pain (See CT results)  Any procedures or intervention occur? See MAR    Mental Status:  Level of Consciousness: Alert (0)    Psych Assessment:   Psychosocial  Psychosocial (WDL): Within Defined Limits    Vitals:  Patient Vitals for the past 24 hrs:   BP Temp Temp src Pulse Resp SpO2 Height Weight   02/17/25 1253 (!) 197/93 -- -- -- 16 99 % -- --   02/17/25 1208 (!) 200/105 -- -- -- 16 99 % -- --   02/17/25 1050 (!) 215/109 -- -- -- 16 99 % -- --   02/17/25 1005 (!) 202/112 -- -- -- 16 100 % -- --   02/17/25 0922 (!) 210/119 -- -- -- -- -- -- 79.8 kg (176 lb)   02/17/25 0919 -- 97.6 °F (36.4 °C) Oral 63 16 97 % 1.676 m (5' 6\") --        LDAs:   Peripheral IV 02/17/25 Left Antecubital (Active)       Ambulatory Status:  Presents to emergency department  because of falls (Syncope, seizure, or loss of consciousness): No, Age > 70: No, Altered Mental Status, Intoxication with alcohol or substance confusion (Disorientation, impaired judgment, poor safety awaremess, or inability to follow instructions): No, Impaired Mobility: Ambulates or transfers with assistive devices or assistance; Unable to ambulate or transer.: No, Nursing Judgement: No    Diagnosis:  DISPOSITION Admitted 02/17/2025 12:49:55 PM   Final diagnoses:   Alcohol-induced acute pancreatitis without infection or necrosis        Consults:  IP CONSULT TO SOCIAL WORK  IP CONSULT TO SOCIAL WORK  IP CONSULT TO ADDICTION SERVICES     Pain Score:  Pain Assessment  Pain Assessment: 0-10  Pain Level: 8  Pain Location: Abdomen  Pain Type: Acute pain    C-SSRS:   Risk of

## 2025-02-17 NOTE — ED TRIAGE NOTES
Patient presents to ER with complaints of abdominal pain that started a week ago. Patient reports being seen here a month ago with similar symptoms. Patient hypertensive upon arrival and states has not been taking any BP medications.

## 2025-02-18 LAB
ANION GAP SERPL CALC-SCNC: 12 MEQ/L (ref 8–16)
ANION GAP SERPL CALC-SCNC: 18 MEQ/L (ref 8–16)
BUN SERPL-MCNC: 8 MG/DL (ref 7–22)
BUN SERPL-MCNC: 8 MG/DL (ref 7–22)
CA-I BLD ISE-SCNC: 1.19 MMOL/L (ref 1.12–1.32)
CALCIUM SERPL-MCNC: 9.2 MG/DL (ref 8.5–10.5)
CALCIUM SERPL-MCNC: 9.4 MG/DL (ref 8.2–9.6)
CHLORIDE SERPL-SCNC: 93 MEQ/L (ref 98–111)
CHLORIDE SERPL-SCNC: 99 MEQ/L (ref 98–111)
CO2 SERPL-SCNC: 23 MEQ/L (ref 23–33)
CO2 SERPL-SCNC: 25 MEQ/L (ref 23–33)
CREAT SERPL-MCNC: 0.7 MG/DL (ref 0.4–1.2)
CREAT SERPL-MCNC: 0.7 MG/DL (ref 0.4–1.2)
DEPRECATED RDW RBC AUTO: 48.3 FL (ref 35–45)
DEPRECATED RDW RBC AUTO: 49.1 FL (ref 35–45)
ERYTHROCYTE [DISTWIDTH] IN BLOOD BY AUTOMATED COUNT: 15.4 % (ref 11.5–14.5)
ERYTHROCYTE [DISTWIDTH] IN BLOOD BY AUTOMATED COUNT: 15.6 % (ref 11.5–14.5)
GFR SERPL CREATININE-BSD FRML MDRD: > 90 ML/MIN/1.73M2
GFR SERPL CREATININE-BSD FRML MDRD: > 90 ML/MIN/1.73M2
GLUCOSE SERPL-MCNC: 80 MG/DL (ref 70–108)
GLUCOSE SERPL-MCNC: 86 MG/DL (ref 70–108)
HCT VFR BLD AUTO: 39.9 % (ref 42–52)
HCT VFR BLD AUTO: 41.7 % (ref 42–52)
HGB BLD-MCNC: 12.9 GM/DL (ref 14–18)
HGB BLD-MCNC: 13.6 GM/DL (ref 14–18)
MAGNESIUM SERPL-MCNC: 1.8 MG/DL (ref 1.6–2.4)
MCH RBC QN AUTO: 27.9 PG (ref 26–33)
MCH RBC QN AUTO: 28 PG (ref 26–33)
MCHC RBC AUTO-ENTMCNC: 32.3 GM/DL (ref 32.2–35.5)
MCHC RBC AUTO-ENTMCNC: 32.6 GM/DL (ref 32.2–35.5)
MCV RBC AUTO: 85.6 FL (ref 80–94)
MCV RBC AUTO: 86.7 FL (ref 80–94)
PHOSPHATE SERPL-MCNC: 3.6 MG/DL (ref 2.4–4.7)
PLATELET # BLD AUTO: 181 THOU/MM3 (ref 130–400)
PLATELET # BLD AUTO: 196 THOU/MM3 (ref 130–400)
PMV BLD AUTO: 9.1 FL (ref 9.4–12.4)
PMV BLD AUTO: 9.3 FL (ref 9.4–12.4)
POTASSIUM SERPL-SCNC: 4.1 MEQ/L (ref 3.5–5.2)
POTASSIUM SERPL-SCNC: 4.3 MEQ/L (ref 3.5–5.2)
RBC # BLD AUTO: 4.6 MILL/MM3 (ref 4.7–6.1)
RBC # BLD AUTO: 4.87 MILL/MM3 (ref 4.7–6.1)
SODIUM SERPL-SCNC: 134 MEQ/L (ref 135–145)
SODIUM SERPL-SCNC: 136 MEQ/L (ref 135–145)
WBC # BLD AUTO: 3.6 THOU/MM3 (ref 4.8–10.8)
WBC # BLD AUTO: 4.1 THOU/MM3 (ref 4.8–10.8)

## 2025-02-18 PROCEDURE — 83735 ASSAY OF MAGNESIUM: CPT

## 2025-02-18 PROCEDURE — 6370000000 HC RX 637 (ALT 250 FOR IP)

## 2025-02-18 PROCEDURE — 84100 ASSAY OF PHOSPHORUS: CPT

## 2025-02-18 PROCEDURE — 96361 HYDRATE IV INFUSION ADD-ON: CPT

## 2025-02-18 PROCEDURE — 96376 TX/PRO/DX INJ SAME DRUG ADON: CPT

## 2025-02-18 PROCEDURE — 6370000000 HC RX 637 (ALT 250 FOR IP): Performed by: STUDENT IN AN ORGANIZED HEALTH CARE EDUCATION/TRAINING PROGRAM

## 2025-02-18 PROCEDURE — 6360000002 HC RX W HCPCS

## 2025-02-18 PROCEDURE — 6370000000 HC RX 637 (ALT 250 FOR IP): Performed by: PHYSICIAN ASSISTANT

## 2025-02-18 PROCEDURE — 99232 SBSQ HOSP IP/OBS MODERATE 35: CPT | Performed by: PHYSICIAN ASSISTANT

## 2025-02-18 PROCEDURE — 80048 BASIC METABOLIC PNL TOTAL CA: CPT

## 2025-02-18 PROCEDURE — 82330 ASSAY OF CALCIUM: CPT

## 2025-02-18 PROCEDURE — 36415 COLL VENOUS BLD VENIPUNCTURE: CPT

## 2025-02-18 PROCEDURE — 96366 THER/PROPH/DIAG IV INF ADDON: CPT

## 2025-02-18 PROCEDURE — 85027 COMPLETE CBC AUTOMATED: CPT

## 2025-02-18 PROCEDURE — 1200000000 HC SEMI PRIVATE

## 2025-02-18 RX ORDER — CALCIUM CARBONATE 500 MG/1
500 TABLET, CHEWABLE ORAL 3 TIMES DAILY PRN
Status: DISCONTINUED | OUTPATIENT
Start: 2025-02-18 | End: 2025-02-20 | Stop reason: HOSPADM

## 2025-02-18 RX ADMIN — HYDRALAZINE HYDROCHLORIDE 10 MG: 20 INJECTION INTRAMUSCULAR; INTRAVENOUS at 04:02

## 2025-02-18 RX ADMIN — METRONIDAZOLE 500 MG: 500 INJECTION, SOLUTION INTRAVENOUS at 02:23

## 2025-02-18 RX ADMIN — CIPROFLOXACIN 400 MG: 400 INJECTION, SOLUTION INTRAVENOUS at 05:45

## 2025-02-18 RX ADMIN — Medication 1 TABLET: at 08:19

## 2025-02-18 RX ADMIN — METRONIDAZOLE 500 MG: 500 INJECTION, SOLUTION INTRAVENOUS at 09:36

## 2025-02-18 RX ADMIN — ONDANSETRON 4 MG: 2 INJECTION, SOLUTION INTRAMUSCULAR; INTRAVENOUS at 00:06

## 2025-02-18 RX ADMIN — LOSARTAN POTASSIUM 50 MG: 50 TABLET, FILM COATED ORAL at 08:19

## 2025-02-18 RX ADMIN — ANTACID TABLETS 500 MG: 500 TABLET, CHEWABLE ORAL at 04:01

## 2025-02-18 RX ADMIN — Medication 100 MG: at 08:19

## 2025-02-18 RX ADMIN — CIPROFLOXACIN 400 MG: 400 INJECTION, SOLUTION INTRAVENOUS at 17:33

## 2025-02-18 RX ADMIN — FOLIC ACID 1 MG: 1 TABLET ORAL at 08:19

## 2025-02-18 RX ADMIN — AMLODIPINE BESYLATE 10 MG: 10 TABLET ORAL at 08:19

## 2025-02-18 ASSESSMENT — PAIN DESCRIPTION - ORIENTATION: ORIENTATION: MID;LOWER

## 2025-02-18 ASSESSMENT — PAIN DESCRIPTION - LOCATION: LOCATION: ABDOMEN

## 2025-02-18 ASSESSMENT — PAIN - FUNCTIONAL ASSESSMENT: PAIN_FUNCTIONAL_ASSESSMENT: ACTIVITIES ARE NOT PREVENTED

## 2025-02-18 ASSESSMENT — PAIN DESCRIPTION - DESCRIPTORS: DESCRIPTORS: ACHING

## 2025-02-18 ASSESSMENT — PAIN SCALES - GENERAL
PAINLEVEL_OUTOF10: 0
PAINLEVEL_OUTOF10: 3

## 2025-02-18 NOTE — PLAN OF CARE
Problem: Discharge Planning  Goal: Discharge to home or other facility with appropriate resources  2/18/2025 1104 by Toshia House, MSW, LSW  Outcome: Progressing  Flowsheets (Taken 2/18/2025 1104)  Discharge to home or other facility with appropriate resources: Identify barriers to discharge with patient and caregiver  Note: Return home no other needs, see SW notes 2/18/25.

## 2025-02-18 NOTE — PROGRESS NOTES
Hospitalist Progress Note    Patient:  Stone Hull      Unit/Bed:6K-20/020-A    YOB: 1970    MRN: [id number]       Acct: [id number]     PCP: No primary care provider on file.    Date of Admission: 2/17/2025    Assessment/Plan:    Acute Pancreatitis w/ pseudocyst: Suspect EtOH-induced.   Lipase 572.7   EtOH: <0.01. Lipid panel TG 2. LFT: Alk phos 118, ALT 18, AST 32.  Total bili 1.9, direct bili 0.5  CRP 0.9, procal 0.07,   CT AP: Fat stranding and inflammation surrounding the body and tail of the pancreas suggests acute pancreatitis. Multiple cystic lesions within the body of the pancreas and a large cystic collection of fluid at the saturnino hepatis measuring up to 7.8 x 5.0 cm suggesting pseudocyst.   Received 1L fluid bolus in ED.   Diet advanced to full liquids today, nursing may advance as tolerated, next to soft, and then a regular diet  Slow IVF  Continue analgesic medications PRN  RUQ US with normal gallbladder without evidence of cholelithiasis, possible hepatic steatosis noted  Flagyl discontinued    Mild alcohol use disorder:   Regularly drinks 4 beers cans per day, last drink 3 days ago per patient.  EtOH: <0.01. Pt denies Hx of withdrawal, DT, or seizures.   CIWA protocol ordered with PRN ativan.   On IVF as above.   Fall and seizure precautions ordered.     Chest pain, unspecified: Resolved  Troponin with flat trend    Essential HTN with Hypertensive Emergency POA: Given elevated BP and elevated troponin  Pt presented to ED with BP of 210/119.   Non-compliant with medication at home, does not take any   Resume his previous home medication of Norvasc and ARB.   Continue PRN anti-hypertensive ordered.  Continue PRN analgesic medications  Continue to monitor, pressure appears labile    Disposition:    [x] Home       [] TCU       [] Rehab       [] Psych       [] SNF       [] Long Term Care Facility       [] Other-    Chief Complaint: Abdominal pain       Subjective: 54 y.o. male    Recent Labs     02/17/25  0930 02/18/25  0333 02/18/25  1427   WBC 5.3 4.1* 3.6*   HGB 13.5* 12.9* 13.6*   HCT 41.8* 39.9* 41.7*    196 181     Recent Labs     02/17/25  0930 02/17/25  1213 02/18/25  0333 02/18/25  1427     --  136 134*   K 4.2  --  4.3 4.1   CL 94*  --  99 93*   CO2 26  --  25 23   BUN 10  --  8 8   CREATININE 0.8  --  0.7 0.7   CALCIUM 9.8  --  9.2 9.4   PHOS  --  3.9 3.6  --      Recent Labs     02/17/25  0930   AST 32   ALT 18   BILIDIR 0.5*   BILITOT 1.9*   ALKPHOS 118     No results for input(s): \"INR\" in the last 72 hours.  No results for input(s): \"CKTOTAL\", \"TROPONINI\" in the last 72 hours.    Urinalysis:    No results found for: \"NITRU\", \"WBCUA\", \"BACTERIA\", \"RBCUA\", \"BLOODU\", \"SPECGRAV\", \"GLUCOSEU\"    Radiology:  US GALLBLADDER RUQ   Final Result   1. Normal gallbladder ultrasound without evidence of cholelithiasis.   2. Possible hepatic steatosis.            **This report has been created using voice recognition software. It may contain   minor errors which are inherent in voice recognition technology.**         Electronically signed by Dr. August Castrejon      CT ABDOMEN PELVIS W IV CONTRAST Additional Contrast? None   Final Result   1. Fat stranding and inflammation surrounding the body and tail of the pancreas   suggests acute pancreatitis in the appropriate clinical setting. The previously   seen pancreatic cyst near the tail of the pancreas is no longer visualized   however there are multiple cystic lesions within the body of the pancreas and a   large cystic collection of fluid at the saturnino hepatis measuring up to 7.8 x 5.0   cm suggesting pseudocyst. Close clinical follow-up and to ensure resolution is   advised.      2. Chronic findings are discussed.            **This report has been created using voice recognition software.  It may contain   minor errors which are inherent in voice recognition technology.**      Electronically signed by Dr. Ángela Cooper

## 2025-02-18 NOTE — CARE COORDINATION
2/18/25, 11:03 AM EST    DISCHARGE PLANNING EVALUATION     SW consult received, addiction services to see for resources. NO other needs at this time SW consult deferred.

## 2025-02-18 NOTE — PLAN OF CARE
Problem: Discharge Planning  Goal: Discharge to home or other facility with appropriate resources  Outcome: Progressing  Flowsheets (Taken 2/17/2025 1945)  Discharge to home or other facility with appropriate resources:   Identify barriers to discharge with patient and caregiver   Arrange for needed discharge resources and transportation as appropriate     Problem: Pain  Goal: Verbalizes/displays adequate comfort level or baseline comfort level  Outcome: Progressing  Flowsheets (Taken 2/17/2025 1945)  Verbalizes/displays adequate comfort level or baseline comfort level:   Encourage patient to monitor pain and request assistance   Assess pain using appropriate pain scale     Problem: Safety - Adult  Goal: Free from fall injury  Outcome: Progressing

## 2025-02-19 ENCOUNTER — APPOINTMENT (OUTPATIENT)
Dept: MRI IMAGING | Age: 55
DRG: 439 | End: 2025-02-19
Payer: COMMERCIAL

## 2025-02-19 LAB
ANION GAP SERPL CALC-SCNC: 12 MEQ/L (ref 8–16)
ANION GAP SERPL CALC-SCNC: 15 MEQ/L (ref 8–16)
BUN SERPL-MCNC: 8 MG/DL (ref 7–22)
BUN SERPL-MCNC: 8 MG/DL (ref 7–22)
CA-I BLD ISE-SCNC: 1.13 MMOL/L (ref 1.12–1.32)
CALCIUM SERPL-MCNC: 9.4 MG/DL (ref 8.2–9.6)
CALCIUM SERPL-MCNC: 9.6 MG/DL (ref 8.2–9.6)
CHLORIDE SERPL-SCNC: 94 MEQ/L (ref 98–111)
CHLORIDE SERPL-SCNC: 98 MEQ/L (ref 98–111)
CO2 SERPL-SCNC: 23 MEQ/L (ref 23–33)
CO2 SERPL-SCNC: 26 MEQ/L (ref 23–33)
CREAT SERPL-MCNC: 0.7 MG/DL (ref 0.4–1.2)
CREAT SERPL-MCNC: 0.8 MG/DL (ref 0.4–1.2)
DEPRECATED RDW RBC AUTO: 48.5 FL (ref 35–45)
DEPRECATED RDW RBC AUTO: 48.6 FL (ref 35–45)
ERYTHROCYTE [DISTWIDTH] IN BLOOD BY AUTOMATED COUNT: 15.5 % (ref 11.5–14.5)
ERYTHROCYTE [DISTWIDTH] IN BLOOD BY AUTOMATED COUNT: 15.6 % (ref 11.5–14.5)
GFR SERPL CREATININE-BSD FRML MDRD: > 90 ML/MIN/1.73M2
GFR SERPL CREATININE-BSD FRML MDRD: > 90 ML/MIN/1.73M2
GLUCOSE SERPL-MCNC: 100 MG/DL (ref 70–108)
GLUCOSE SERPL-MCNC: 82 MG/DL (ref 70–108)
HCT VFR BLD AUTO: 41.5 % (ref 42–52)
HCT VFR BLD AUTO: 43.5 % (ref 42–52)
HGB BLD-MCNC: 13.4 GM/DL (ref 14–18)
HGB BLD-MCNC: 14.4 GM/DL (ref 14–18)
MAGNESIUM SERPL-MCNC: 1.9 MG/DL (ref 1.6–2.4)
MCH RBC QN AUTO: 27.8 PG (ref 26–33)
MCH RBC QN AUTO: 28.6 PG (ref 26–33)
MCHC RBC AUTO-ENTMCNC: 32.3 GM/DL (ref 32.2–35.5)
MCHC RBC AUTO-ENTMCNC: 33.1 GM/DL (ref 32.2–35.5)
MCV RBC AUTO: 86.1 FL (ref 80–94)
MCV RBC AUTO: 86.5 FL (ref 80–94)
PHOSPHATE SERPL-MCNC: 3.7 MG/DL (ref 2.4–4.7)
PLATELET # BLD AUTO: 183 THOU/MM3 (ref 130–400)
PLATELET # BLD AUTO: 184 THOU/MM3 (ref 130–400)
PMV BLD AUTO: 9.2 FL (ref 9.4–12.4)
PMV BLD AUTO: 9.4 FL (ref 9.4–12.4)
POTASSIUM SERPL-SCNC: 4 MEQ/L (ref 3.5–5.2)
POTASSIUM SERPL-SCNC: 4.6 MEQ/L (ref 3.5–5.2)
RBC # BLD AUTO: 4.82 MILL/MM3 (ref 4.7–6.1)
RBC # BLD AUTO: 5.03 MILL/MM3 (ref 4.7–6.1)
SODIUM SERPL-SCNC: 133 MEQ/L (ref 135–145)
SODIUM SERPL-SCNC: 135 MEQ/L (ref 135–145)
WBC # BLD AUTO: 3.7 THOU/MM3 (ref 4.8–10.8)
WBC # BLD AUTO: 4.4 THOU/MM3 (ref 4.8–10.8)

## 2025-02-19 PROCEDURE — 80048 BASIC METABOLIC PNL TOTAL CA: CPT

## 2025-02-19 PROCEDURE — 82330 ASSAY OF CALCIUM: CPT

## 2025-02-19 PROCEDURE — 99233 SBSQ HOSP IP/OBS HIGH 50: CPT | Performed by: PHYSICIAN ASSISTANT

## 2025-02-19 PROCEDURE — A9579 GAD-BASE MR CONTRAST NOS,1ML: HCPCS | Performed by: PHYSICIAN ASSISTANT

## 2025-02-19 PROCEDURE — 74183 MRI ABD W/O CNTR FLWD CNTR: CPT

## 2025-02-19 PROCEDURE — 6360000004 HC RX CONTRAST MEDICATION: Performed by: PHYSICIAN ASSISTANT

## 2025-02-19 PROCEDURE — 84100 ASSAY OF PHOSPHORUS: CPT

## 2025-02-19 PROCEDURE — 83735 ASSAY OF MAGNESIUM: CPT

## 2025-02-19 PROCEDURE — 85027 COMPLETE CBC AUTOMATED: CPT

## 2025-02-19 PROCEDURE — 1200000000 HC SEMI PRIVATE

## 2025-02-19 PROCEDURE — 6360000002 HC RX W HCPCS

## 2025-02-19 PROCEDURE — 36415 COLL VENOUS BLD VENIPUNCTURE: CPT

## 2025-02-19 PROCEDURE — 6370000000 HC RX 637 (ALT 250 FOR IP)

## 2025-02-19 PROCEDURE — 2580000003 HC RX 258: Performed by: PHYSICIAN ASSISTANT

## 2025-02-19 RX ADMIN — Medication 100 MG: at 08:42

## 2025-02-19 RX ADMIN — LOSARTAN POTASSIUM 50 MG: 50 TABLET, FILM COATED ORAL at 08:42

## 2025-02-19 RX ADMIN — SODIUM CHLORIDE, SODIUM LACTATE, POTASSIUM CHLORIDE, AND CALCIUM CHLORIDE: .6; .31; .03; .02 INJECTION, SOLUTION INTRAVENOUS at 16:19

## 2025-02-19 RX ADMIN — FOLIC ACID 1 MG: 1 TABLET ORAL at 08:38

## 2025-02-19 RX ADMIN — GADOTERIDOL 15 ML: 279.3 INJECTION, SOLUTION INTRAVENOUS at 19:06

## 2025-02-19 RX ADMIN — Medication 1 TABLET: at 08:42

## 2025-02-19 RX ADMIN — CIPROFLOXACIN 400 MG: 400 INJECTION, SOLUTION INTRAVENOUS at 05:02

## 2025-02-19 RX ADMIN — HYDROMORPHONE HYDROCHLORIDE 0.5 MG: 1 INJECTION, SOLUTION INTRAMUSCULAR; INTRAVENOUS; SUBCUTANEOUS at 20:18

## 2025-02-19 RX ADMIN — HYDROMORPHONE HYDROCHLORIDE 0.5 MG: 1 INJECTION, SOLUTION INTRAMUSCULAR; INTRAVENOUS; SUBCUTANEOUS at 03:14

## 2025-02-19 RX ADMIN — AMLODIPINE BESYLATE 10 MG: 10 TABLET ORAL at 08:38

## 2025-02-19 ASSESSMENT — PAIN DESCRIPTION - LOCATION: LOCATION: ABDOMEN

## 2025-02-19 ASSESSMENT — PAIN SCALES - GENERAL
PAINLEVEL_OUTOF10: 8
PAINLEVEL_OUTOF10: 7
PAINLEVEL_OUTOF10: 8
PAINLEVEL_OUTOF10: 2

## 2025-02-19 ASSESSMENT — PAIN DESCRIPTION - ONSET: ONSET: ON-GOING

## 2025-02-19 ASSESSMENT — PAIN DESCRIPTION - DESCRIPTORS: DESCRIPTORS: ACHING

## 2025-02-19 ASSESSMENT — PAIN DESCRIPTION - PAIN TYPE: TYPE: ACUTE PAIN

## 2025-02-19 ASSESSMENT — PATIENT HEALTH QUESTIONNAIRE - PHQ9
SUM OF ALL RESPONSES TO PHQ9 QUESTIONS 1 & 2: 0
SUM OF ALL RESPONSES TO PHQ QUESTIONS 1-9: 0
1. LITTLE INTEREST OR PLEASURE IN DOING THINGS: NOT AT ALL
SUM OF ALL RESPONSES TO PHQ QUESTIONS 1-9: 0
2. FEELING DOWN, DEPRESSED OR HOPELESS: NOT AT ALL

## 2025-02-19 ASSESSMENT — LIFESTYLE VARIABLES
HOW OFTEN DO YOU HAVE A DRINK CONTAINING ALCOHOL: 2-3 TIMES A WEEK
HOW MANY STANDARD DRINKS CONTAINING ALCOHOL DO YOU HAVE ON A TYPICAL DAY: 1 OR 2

## 2025-02-19 ASSESSMENT — PAIN DESCRIPTION - ORIENTATION: ORIENTATION: RIGHT;UPPER

## 2025-02-19 ASSESSMENT — PAIN - FUNCTIONAL ASSESSMENT: PAIN_FUNCTIONAL_ASSESSMENT: ACTIVITIES ARE NOT PREVENTED

## 2025-02-19 NOTE — CONSULTS
Brief Intervention and Referral to Treatment Summary    Patient was provided PHQ-9, AUDIT-C and DAST Screening:      PHQ-9 Score: 0  AUDIT-C Score:  3  DAST Score:  0    Patient’s substance use is considered     Low Risk/Healthy      Patient’s depression is considered:     Minimal    Brief Education Was Provided    Patient was not receptive      Brief Intervention Is Provided (Only for AUDIT-C or DAST)     Patient denies readiness to decrease and/or stop use and a plan was not discussed

## 2025-02-19 NOTE — CARE COORDINATION
Case Management Assessment Initial Evaluation    Date/Time of Evaluation: 2/19/2025 9:06 AM  Assessment Completed by: Jennifer Mendez RN    If patient is discharged prior to next notation, then this note serves as note for discharge by case management.    Patient Name: Stone Hull                   YOB: 1970  Diagnosis: Alcohol-induced acute pancreatitis without infection or necrosis [K85.20]  Acute pancreatitis, unspecified complication status, unspecified pancreatitis type [K85.90]                   Date / Time: 2/17/2025  9:15 AM  Location: [address] [place] [address]     Patient Admission Status: Inpatient   Readmission Risk Low 0-14, Mod 15-19), High > 20: Readmission Risk Score: 5.5    Current PCP: PCP thru [facility] Care Decision Makers:   Wife: Gemini Hull    Additional Case Management Notes: to ED for evaluation of abdominal pain   Addiction services to see pt.  Procedures:     Imaging:   US gallbladder:  Normal gallbladder ultrasound without evidence of cholelithiasis.   Possible hepatic steatosis.            Patient Goals/Plan/Treatment Preferences: Stone who goes by Douglas lives local with a roommate who works here. His address is  [address] [place] [address]. He was independent pta, has PCP thru [facility] insurance, declined DME, or needs.

## 2025-02-19 NOTE — PROGRESS NOTES
Hospitalist Progress Note    Patient:  Stone Hull      Unit/Bed:6K-20/020-A    YOB: 1970    MRN: [id number]       Acct: [id number]     PCP: No primary care provider on file.    Date of Admission: 2/17/2025    Assessment/Plan:    Acute Pancreatitis w/ pseudocyst: Suspect EtOH-induced.   Lipase 572.7   EtOH: <0.01. Lipid panel TG 2. LFT: Alk phos 118, ALT 18, AST 32.  Total bili 1.9, direct bili 0.5  CRP 0.9, procal 0.07,   CT AP: Fat stranding and inflammation surrounding the body and tail of the pancreas suggests acute pancreatitis. Multiple cystic lesions within the body of the pancreas and a large cystic collection of fluid at the saturnino hepatis measuring up to 7.8 x 5.0 cm suggesting pseudocyst.   MRCP ordered for further evaluation of possible cause, patient reports he only drinks alcohol infrequently  Received 1L fluid bolus in ED.   Diet advanced to soft diet today  Slow IVF  Continue analgesic medications PRN  RUQ US with normal gallbladder without evidence of cholelithiasis, possible hepatic steatosis noted  Cipro-Flagyl discontinued, no indication    Mild alcohol use disorder:   Per admitting provider, patient regularly drinks 4 beers cans per day, last drink 3 days ago per patient.   Patient reports infrequent alcohol ingestion, question if alcohol is true cause of pancreatitis  EtOH: <0.01. Pt denies Hx of withdrawal, DT, or seizures.   CIWA protocol ordered with PRN ativan.   On IVF as above.   Fall and seizure precautions ordered.     Chest pain, unspecified: Resolved  Troponin with flat trend    Essential HTN with Hypertensive Emergency POA: Given elevated BP and elevated troponin  Pt presented to ED with BP of 210/119.   Non-compliant with medication at home, does not take any   Resume his previous home medication of Norvasc and ARB.   Continue PRN anti-hypertensive ordered.  Continue PRN analgesic medications  Continue to monitor, pressure appears labile    Disposition:

## 2025-02-19 NOTE — PLAN OF CARE
Problem: Discharge Planning  Goal: Discharge to home or other facility with appropriate resources  2/18/2025 2348 by Flaca Miller, RN  Outcome: Progressing  Flowsheets (Taken 2/18/2025 1953)  Discharge to home or other facility with appropriate resources:   Identify barriers to discharge with patient and caregiver   Arrange for needed discharge resources and transportation as appropriate   Identify discharge learning needs (meds, wound care, etc)  2/18/2025 1104 by Toshia House, MSW, LSW  Outcome: Progressing  Flowsheets (Taken 2/18/2025 1104)  Discharge to home or other facility with appropriate resources: Identify barriers to discharge with patient and caregiver  Note: Return home no other needs, see SW notes 2/18/25.     Problem: Pain  Goal: Verbalizes/displays adequate comfort level or baseline comfort level  Outcome: Progressing  Flowsheets (Taken 2/18/2025 1953)  Verbalizes/displays adequate comfort level or baseline comfort level:   Assess pain using appropriate pain scale   Encourage patient to monitor pain and request assistance     Problem: Safety - Adult  Goal: Free from fall injury  Outcome: Progressing

## 2025-02-20 VITALS
OXYGEN SATURATION: 99 % | HEIGHT: 66 IN | RESPIRATION RATE: 19 BRPM | BODY MASS INDEX: 25.37 KG/M2 | TEMPERATURE: 98.4 F | SYSTOLIC BLOOD PRESSURE: 142 MMHG | DIASTOLIC BLOOD PRESSURE: 93 MMHG | HEART RATE: 56 BPM | WEIGHT: 157.85 LBS

## 2025-02-20 LAB
ANION GAP SERPL CALC-SCNC: 17 MEQ/L (ref 8–16)
BUN SERPL-MCNC: 9 MG/DL (ref 7–22)
CA-I BLD ISE-SCNC: 1.18 MMOL/L (ref 1.12–1.32)
CALCIUM SERPL-MCNC: 9.3 MG/DL (ref 8.2–9.6)
CHLORIDE SERPL-SCNC: 97 MEQ/L (ref 98–111)
CO2 SERPL-SCNC: 23 MEQ/L (ref 23–33)
CREAT SERPL-MCNC: 0.7 MG/DL (ref 0.4–1.2)
DEPRECATED RDW RBC AUTO: 47.8 FL (ref 35–45)
ERYTHROCYTE [DISTWIDTH] IN BLOOD BY AUTOMATED COUNT: 15.2 % (ref 11.5–14.5)
GFR SERPL CREATININE-BSD FRML MDRD: > 90 ML/MIN/1.73M2
GLUCOSE SERPL-MCNC: 88 MG/DL (ref 70–108)
HCT VFR BLD AUTO: 40.4 % (ref 42–52)
HGB BLD-MCNC: 13.1 GM/DL (ref 14–18)
MAGNESIUM SERPL-MCNC: 2.1 MG/DL (ref 1.6–2.4)
MCH RBC QN AUTO: 27.9 PG (ref 26–33)
MCHC RBC AUTO-ENTMCNC: 32.4 GM/DL (ref 32.2–35.5)
MCV RBC AUTO: 86 FL (ref 80–94)
PHOSPHATE SERPL-MCNC: 3.5 MG/DL (ref 2.4–4.7)
PLATELET # BLD AUTO: 170 THOU/MM3 (ref 130–400)
PMV BLD AUTO: 9.1 FL (ref 9.4–12.4)
POTASSIUM SERPL-SCNC: 4.5 MEQ/L (ref 3.5–5.2)
RBC # BLD AUTO: 4.7 MILL/MM3 (ref 4.7–6.1)
REASON FOR REJECTION: NORMAL
REJECTED TEST: NORMAL
SODIUM SERPL-SCNC: 137 MEQ/L (ref 135–145)
WBC # BLD AUTO: 4.3 THOU/MM3 (ref 4.8–10.8)

## 2025-02-20 PROCEDURE — 84100 ASSAY OF PHOSPHORUS: CPT

## 2025-02-20 PROCEDURE — 99238 HOSP IP/OBS DSCHRG MGMT 30/<: CPT | Performed by: PHYSICIAN ASSISTANT

## 2025-02-20 PROCEDURE — 83735 ASSAY OF MAGNESIUM: CPT

## 2025-02-20 PROCEDURE — 82330 ASSAY OF CALCIUM: CPT

## 2025-02-20 PROCEDURE — 2580000003 HC RX 258: Performed by: PHYSICIAN ASSISTANT

## 2025-02-20 PROCEDURE — 36415 COLL VENOUS BLD VENIPUNCTURE: CPT

## 2025-02-20 PROCEDURE — 6370000000 HC RX 637 (ALT 250 FOR IP)

## 2025-02-20 PROCEDURE — 85027 COMPLETE CBC AUTOMATED: CPT

## 2025-02-20 PROCEDURE — 80048 BASIC METABOLIC PNL TOTAL CA: CPT

## 2025-02-20 PROCEDURE — 6370000000 HC RX 637 (ALT 250 FOR IP): Performed by: PHYSICIAN ASSISTANT

## 2025-02-20 RX ORDER — CALCIUM CARBONATE 500 MG/1
500 TABLET, CHEWABLE ORAL 3 TIMES DAILY PRN
COMMUNITY
Start: 2025-02-20 | End: 2025-03-22

## 2025-02-20 RX ORDER — CLONIDINE HYDROCHLORIDE 0.1 MG/1
0.1 TABLET ORAL 2 TIMES DAILY
Status: DISCONTINUED | OUTPATIENT
Start: 2025-02-20 | End: 2025-02-20 | Stop reason: HOSPADM

## 2025-02-20 RX ORDER — AMLODIPINE BESYLATE 10 MG/1
10 TABLET ORAL DAILY
Qty: 30 TABLET | Refills: 3 | Status: SHIPPED | OUTPATIENT
Start: 2025-02-21

## 2025-02-20 RX ORDER — ONDANSETRON 4 MG/1
4 TABLET, ORALLY DISINTEGRATING ORAL EVERY 8 HOURS PRN
Qty: 10 TABLET | Refills: 0 | Status: SHIPPED | OUTPATIENT
Start: 2025-02-20

## 2025-02-20 RX ORDER — HYDROCODONE BITARTRATE AND ACETAMINOPHEN 5; 325 MG/1; MG/1
1 TABLET ORAL EVERY 6 HOURS PRN
Qty: 20 TABLET | Refills: 0 | Status: SHIPPED | OUTPATIENT
Start: 2025-02-20 | End: 2025-02-25

## 2025-02-20 RX ORDER — VALSARTAN 160 MG/1
160 TABLET ORAL DAILY
Qty: 30 TABLET | Refills: 1 | Status: SHIPPED | OUTPATIENT
Start: 2025-02-20

## 2025-02-20 RX ORDER — CLONIDINE HYDROCHLORIDE 0.1 MG/1
0.1 TABLET ORAL 2 TIMES DAILY
Qty: 60 TABLET | Refills: 1 | Status: SHIPPED | OUTPATIENT
Start: 2025-02-20

## 2025-02-20 RX ADMIN — FOLIC ACID 1 MG: 1 TABLET ORAL at 09:37

## 2025-02-20 RX ADMIN — Medication 100 MG: at 09:37

## 2025-02-20 RX ADMIN — SODIUM CHLORIDE, SODIUM LACTATE, POTASSIUM CHLORIDE, AND CALCIUM CHLORIDE: .6; .31; .03; .02 INJECTION, SOLUTION INTRAVENOUS at 09:33

## 2025-02-20 RX ADMIN — LOSARTAN POTASSIUM 50 MG: 50 TABLET, FILM COATED ORAL at 09:37

## 2025-02-20 RX ADMIN — CLONIDINE HYDROCHLORIDE 0.1 MG: 0.1 TABLET ORAL at 13:35

## 2025-02-20 RX ADMIN — AMLODIPINE BESYLATE 10 MG: 10 TABLET ORAL at 09:36

## 2025-02-20 RX ADMIN — Medication 1 TABLET: at 09:37

## 2025-02-20 ASSESSMENT — PAIN SCALES - WONG BAKER: WONGBAKER_NUMERICALRESPONSE: NO HURT

## 2025-02-20 ASSESSMENT — PAIN SCALES - GENERAL: PAINLEVEL_OUTOF10: 0

## 2025-02-20 NOTE — PROGRESS NOTES
Pt is a&o x4, speech is clear and appropriate. Denies any pain at this time. Pupils round, equal and reactive to light with consensual response. Oral mucosa moist and intact. Upper extremities warm, dry and intact with purposeful movement. IV in left posterior forearm, no redness or swelling present. Hand grasp strong and equal, arm drift negative. Lung sounds clear and equal throughout. Telemetry monitoring continued. Abdomen is flat and non distended, no pain with palpation. Bowel sounds present in all 4 quadrants. Lower extremities warm, dry and intact with purposeful movement. No edema present. Pedal pulses present and strong, pedal push/pull strong and equal bilaterally. Pt rolls and repositions independently, no signs of posterior skin breakdown. Pt resting comfortably in bed, denies any further needs at this time. Bed alarm is on, call light within reach.

## 2025-02-20 NOTE — PROGRESS NOTES
Spiritual Health History and Assessment/Progress Note  [facility]    (P) Initial Encounter,  ,  ,      Name: Stone Hull MRN: [id number]    Age: 54 y.o.     Sex: male   Language: English   Jain: Druze   Acute pancreatitis, unspecified complication status, unspecified pancreatitis type     Date: 2/20/2025            Total Time Calculated: (P) 6 min              Spiritual Assessment continued in [facility]LEMET[facility]K        Referral/Consult From: (P) Nurse   Encounter Overview/Reason: (P) Initial Encounter  Service Provided For: (P) Patient    Shruthi, Belief, Meaning:   Patient identifies as spiritual  Family/Friends No family/friends present      Importance and Influence:  Patient has spiritual/personal beliefs that influence decisions regarding their health  Family/Friends No family/friends present    Community:  Patient feels well-supported. Support system includes: Extended family  Family/Friends feel well-supported. Support system includes: Friends    Assessment and Plan of Care:     Patient Interventions include: Facilitated expression of thoughts and feelings  Family/Friends Interventions include: Facilitated expression of thoughts and feelings    Patient Plan of Care: No spiritual needs identified for follow-up  Family/Friends Plan of Care: No family/friends present    Electronically signed by TONJA Gibson   02/20/25 1430   Encounter Summary   Encounter Overview/Reason Initial Encounter   Service Provided For Patient   Referral/Consult From Nurse   Support System Family members   Last Encounter  02/20/25   Complexity of Encounter Low   Begin Time 1150   End Time  1156   Total Time Calculated 6 min   Spiritual/Emotional needs   Type Spiritual Support   Assessment/Intervention/Outcome   Assessment Hopeful   Intervention Empowerment   Outcome Encouraged      on 2/20/2025 at 2:32 PM

## 2025-02-20 NOTE — PROGRESS NOTES
Discharge teaching and instructions for diagnosis/procedure of acute pancreatitis completed with patient using teachback method. AVS reviewed. Printed prescriptions given to patient. Patient voiced understanding regarding prescriptions, follow up appointments, and care of self at home. Discharged in a wheelchair to  home with support per family.

## 2025-02-20 NOTE — DISCHARGE SUMMARY
Hospital Medicine Discharge Summary      Patient Identification:   Stone Hull   : 1970  MRN: [id number]   Account: [id number]      Patient's PCP: No primary care provider on file.    Admit Date: 2025     Discharge Date: 25    Admitting Physician: No admitting provider for patient encounter.     Discharge Physician: Ced Love PA-C     Stone Hull is a 54 y.o. male admitted to [facility] on 2025.      HPI On Admission From H&P:    \"Stone Hull is a 54 y.o. male with PMHx of HTN who presents to [facility] on 2025 for evaluation of epigastric pain.  Patient states symptoms started 1 week ago, is having significant epigastric tenderness that is not going away and worsening.  Patient states for the last 3 days unable to eat, as significant nausea and vomiting.  Unable to tolerate p.o.  States symptoms similar to when he was last seen in ED in December and was diagnosed with pancreatitis.  Unable to get relief at home.  Upon arrival to ED CTAP shows acute pancreatitis with pancreatic pseudocyst, elevated lipase levels.  In the ED patient given morphine, Dilaudid and 1 L fluid bolus.  Patient admitted to hospital services for further management.\"    Assessment/Plan With Discharge Diagnoses:    Acute Pancreatitis w/ pseudocyst: Suspect EtOH-induced.   Lipase 572.7   EtOH: <0.01. Lipid panel TG 2. LFT: Alk phos 118, ALT 18, AST 32.  Total bili 1.9, direct bili 0.5  CRP 0.9, procal 0.07,   CT AP: Fat stranding and inflammation surrounding the body and tail of the pancreas suggests acute pancreatitis. Multiple cystic lesions within the body of the pancreas and a large cystic collection of fluid at the saturnino hepatis measuring up to 7.8 x 5.0 cm suggesting pseudocyst.   MRCP ordered for further evaluation of possible cause, patient reports he only drinks alcohol infrequently. MRCP with evidence of Pseudocysts, radiologist recommends repeat  2/20/2025 1351  Last data filed at 2/20/2025 1337  Gross per 24 hour   Intake 770 ml   Output 1200 ml   Net -430 ml         Labs: For convenience and continuity at follow-up the following most recent labs are provided:    CBC:    Lab Results   Component Value Date/Time    WBC 4.3 02/20/2025 02:01 AM    HGB 13.1 02/20/2025 02:01 AM    HCT 40.4 02/20/2025 02:01 AM     02/20/2025 02:01 AM       Renal:    Lab Results   Component Value Date/Time     02/20/2025 02:01 AM    K 4.5 02/20/2025 02:01 AM    K 4.4 12/13/2024 01:40 PM    CL 97 02/20/2025 02:01 AM    CO2 23 02/20/2025 02:01 AM    BUN 9 02/20/2025 02:01 AM    CREATININE 0.7 02/20/2025 02:01 AM    CALCIUM 9.3 02/20/2025 02:01 AM    PHOS 3.5 02/20/2025 05:59 AM         Significant Diagnostic Studies    Radiology:   MRI ABDOMEN W WO CONTRAST MRCP   Final Result   1. Limited examination secondary to motion artifact.   2. Multiple cystic foci within and adjacent to the pancreas, consistent    with a pancreatic pseudocyst.   3. Repeat examination after resolution of acute pancreatitis, and when    patient is able to maintain a stationary position is recommended for    further assessment.      This document has been electronically signed by: Jai Méndez MD on    02/19/2025 07:30 PM      US GALLBLADDER RUQ   Final Result   1. Normal gallbladder ultrasound without evidence of cholelithiasis.   2. Possible hepatic steatosis.            **This report has been created using voice recognition software. It may contain   minor errors which are inherent in voice recognition technology.**         Electronically signed by Dr. August Castrejon      CT ABDOMEN PELVIS W IV CONTRAST Additional Contrast? None   Final Result   1. Fat stranding and inflammation surrounding the body and tail of the pancreas   suggests acute pancreatitis in the appropriate clinical setting. The previously   seen pancreatic cyst near the tail of the pancreas is no longer visualized   however

## 2025-02-20 NOTE — CARE COORDINATION
2/20/25, 11:09 AM EST    Met with Stone, he denies discharge needs. He has a PCP but is uncertain of physician's name or location. He states having this information at home and will call and schedule his own f/u appt.   Patient goals/plan/ treatment preferences discussed by  and .  Patient goals/plan/ treatment preferences reviewed with patient/ family.  Patient/ family verbalize understanding of discharge plan and are in agreement with goal/plan/treatment preferences.  Understanding was demonstrated using the teach back method.  AVS provided by RN at time of discharge, which includes all necessary medical information pertaining to the patients current course of illness, treatment, post-discharge goals of care, and treatment preferences.     Services At/After Discharge: None

## 2025-02-21 ENCOUNTER — CARE COORDINATION (OUTPATIENT)
Dept: CARE COORDINATION | Age: 55
End: 2025-02-21

## 2025-02-21 NOTE — CARE COORDINATION
Care Transitions Note    Initial Call - Call within 2 business days of discharge: Yes    Attempted to reach patient for transitions of care follow up. Unable to reach patient.    Outreach Attempts:   Unable to leave message.     2nd attempt to contact pt for initial care transition follow up.  Unable to reach pt.  Unable to leave a message when calling home number d/t mailbox not being set up.  Also attempted to contact mobile number listed.  Recording states call cannot be completed as dialed.  CTN attempted to re-dial the phone number.  Same recording came on.  Will try again at a later time.      Patient: Stone Hull    Patient : 1970   MRN: [id number]    Reason for Admission: Abdominal pain, Alcohol induced acute pancreatitis w/o infection or necrosis  Discharge Date: 25  RURS: Readmission Risk Score: 5.4    Last Discharge Facility       Date Complaint Diagnosis Description Type Department Provider    25 Abdominal Pain Alcohol-induced acute pancreatitis without infection or necrosis ... ED to Hosp-Admission (Discharged) (ADMITTED) [facility] Ced Love, PAFlynnC; Rakesh Emmanuel...            Was this an external facility discharge? No    Follow Up Appointment:   Patient does not have a follow up appointment scheduled at time of call.       Plan for follow-up on next business day.      Nona Stearns RN

## 2025-02-24 ENCOUNTER — CARE COORDINATION (OUTPATIENT)
Dept: CARE COORDINATION | Age: 55
End: 2025-02-24

## 2025-02-24 NOTE — CARE COORDINATION
Care Transitions Note    Initial Call - Call within 2 business days of discharge: Yes    Attempted to reach patient for transitions of care follow up. Unable to reach patient.    Outreach Attempts:   Multiple attempts to contact patient at phone numbers on file.   [employer] message sent.     Patient: Stone Hull    Patient : 1970   MRN: [id number]    Reason for Admission: abd pain  Discharge Date: 25  RURS: Readmission Risk Score: 5.4    Last Discharge Facility       Date Complaint Diagnosis Description Type Department Provider    25 Abdominal Pain Alcohol-induced acute pancreatitis without infection or necrosis ... ED to Hosp-Admission (Discharged) (ADMITTED) [facility] Ced Love, ANAYA; Rakesh Emmanuel...            Was this an external facility discharge? No    Follow Up Appointment:   Patient does not have a follow up appointment scheduled at time of call.  Unable to reach pt.      No further follow-up call indicated     Juliette Arvizu RN

## 2025-03-29 ENCOUNTER — HOSPITAL ENCOUNTER (INPATIENT)
Age: 55
LOS: 2 days | Discharge: HOME | DRG: 439 | End: 2025-04-01
Attending: STUDENT IN AN ORGANIZED HEALTH CARE EDUCATION/TRAINING PROGRAM | Admitting: INTERNAL MEDICINE
Payer: COMMERCIAL

## 2025-03-29 DIAGNOSIS — K86.3 PANCREATIC PSEUDOCYST: ICD-10-CM

## 2025-03-29 DIAGNOSIS — I82.891 CHRONIC THROMBOSIS OF SPLENIC VEIN: ICD-10-CM

## 2025-03-29 DIAGNOSIS — K85.90 ACUTE PANCREATITIS, UNSPECIFIED COMPLICATION STATUS, UNSPECIFIED PANCREATITIS TYPE: Primary | ICD-10-CM

## 2025-03-29 PROCEDURE — 99285 EMERGENCY DEPT VISIT HI MDM: CPT

## 2025-03-29 ASSESSMENT — PAIN SCALES - GENERAL: PAINLEVEL_OUTOF10: 10

## 2025-03-29 ASSESSMENT — PAIN DESCRIPTION - LOCATION: LOCATION: ABDOMEN

## 2025-03-29 ASSESSMENT — PAIN DESCRIPTION - DESCRIPTORS: DESCRIPTORS: BURNING;CRAMPING

## 2025-03-29 ASSESSMENT — PAIN - FUNCTIONAL ASSESSMENT: PAIN_FUNCTIONAL_ASSESSMENT: 0-10

## 2025-03-30 ENCOUNTER — APPOINTMENT (OUTPATIENT)
Dept: CT IMAGING | Age: 55
DRG: 439 | End: 2025-03-30
Payer: COMMERCIAL

## 2025-03-30 PROBLEM — K85.90 RECURRENT ACUTE PANCREATITIS: Status: ACTIVE | Noted: 2025-03-30

## 2025-03-30 LAB
ALBUMIN SERPL BCG-MCNC: 3.9 G/DL (ref 3.4–4.9)
ALP SERPL-CCNC: 97 U/L (ref 40–129)
ALT SERPL W/O P-5'-P-CCNC: 25 U/L (ref 10–50)
ANION GAP SERPL CALC-SCNC: 12 MEQ/L (ref 8–16)
ANION GAP SERPL CALC-SCNC: 9 MEQ/L (ref 8–16)
AST SERPL-CCNC: 33 U/L (ref 10–50)
BASOPHILS ABSOLUTE: 0 THOU/MM3 (ref 0–0.1)
BASOPHILS NFR BLD AUTO: 0.8 %
BILIRUB CONJ SERPL-MCNC: 0.2 MG/DL (ref 0–0.2)
BILIRUB SERPL-MCNC: 0.3 MG/DL (ref 0.3–1.2)
BUN SERPL-MCNC: 6 MG/DL (ref 8–23)
BUN SERPL-MCNC: 7 MG/DL (ref 8–23)
CA-I BLD ISE-SCNC: 1.21 MMOL/L (ref 1.12–1.32)
CALCIUM SERPL-MCNC: 8.7 MG/DL (ref 8.6–10)
CALCIUM SERPL-MCNC: 9.3 MG/DL (ref 8.6–10)
CHLORIDE SERPL-SCNC: 106 MEQ/L (ref 98–111)
CHLORIDE SERPL-SCNC: 110 MEQ/L (ref 98–111)
CO2 SERPL-SCNC: 23 MEQ/L (ref 22–29)
CO2 SERPL-SCNC: 23 MEQ/L (ref 22–29)
CREAT SERPL-MCNC: 0.8 MG/DL (ref 0.7–1.2)
CREAT SERPL-MCNC: 0.8 MG/DL (ref 0.7–1.2)
DEPRECATED RDW RBC AUTO: 55.7 FL (ref 35–45)
EKG ATRIAL RATE: 86 BPM
EKG P AXIS: 39 DEGREES
EKG P-R INTERVAL: 150 MS
EKG Q-T INTERVAL: 384 MS
EKG QRS DURATION: 92 MS
EKG QTC CALCULATION (BAZETT): 459 MS
EKG R AXIS: 31 DEGREES
EKG VENTRICULAR RATE: 86 BPM
EOSINOPHIL NFR BLD AUTO: 2.5 %
EOSINOPHILS ABSOLUTE: 0.1 THOU/MM3 (ref 0–0.4)
ERYTHROCYTE [DISTWIDTH] IN BLOOD BY AUTOMATED COUNT: 17.2 % (ref 11.5–14.5)
GFR SERPL CREATININE-BSD FRML MDRD: > 90 ML/MIN/1.73M2
GFR SERPL CREATININE-BSD FRML MDRD: > 90 ML/MIN/1.73M2
GLUCOSE SERPL-MCNC: 89 MG/DL (ref 74–109)
GLUCOSE SERPL-MCNC: 98 MG/DL (ref 74–109)
HCT VFR BLD AUTO: 37.5 % (ref 42–52)
HGB BLD-MCNC: 11.8 GM/DL (ref 14–18)
IMM GRANULOCYTES # BLD AUTO: 0.01 THOU/MM3 (ref 0–0.07)
IMM GRANULOCYTES NFR BLD AUTO: 0.2 %
LIPASE SERPL-CCNC: 220 U/L (ref 13–60)
LYMPHOCYTES ABSOLUTE: 1.7 THOU/MM3 (ref 1–4.8)
LYMPHOCYTES NFR BLD AUTO: 35.1 %
MAGNESIUM SERPL-MCNC: 2.1 MG/DL (ref 1.6–2.6)
MCH RBC QN AUTO: 27.8 PG (ref 26–33)
MCHC RBC AUTO-ENTMCNC: 31.5 GM/DL (ref 32.2–35.5)
MCV RBC AUTO: 88.4 FL (ref 80–94)
MONOCYTES ABSOLUTE: 0.4 THOU/MM3 (ref 0.4–1.3)
MONOCYTES NFR BLD AUTO: 8.8 %
NEUTROPHILS ABSOLUTE: 2.5 THOU/MM3 (ref 1.8–7.7)
NEUTROPHILS NFR BLD AUTO: 52.6 %
NRBC BLD AUTO-RTO: 0 /100 WBC
OSMOLALITY SERPL CALC.SUM OF ELEC: 279.2 MOSMOL/KG (ref 275–300)
PHOSPHATE SERPL-MCNC: 4 MG/DL (ref 2.5–4.5)
PLATELET # BLD AUTO: 212 THOU/MM3 (ref 130–400)
PMV BLD AUTO: 9 FL (ref 9.4–12.4)
POTASSIUM SERPL-SCNC: 4.2 MEQ/L (ref 3.5–5.2)
POTASSIUM SERPL-SCNC: 4.2 MEQ/L (ref 3.5–5.2)
PROT SERPL-MCNC: 7 G/DL (ref 6.4–8.3)
RBC # BLD AUTO: 4.24 MILL/MM3 (ref 4.7–6.1)
SODIUM SERPL-SCNC: 141 MEQ/L (ref 135–145)
SODIUM SERPL-SCNC: 142 MEQ/L (ref 135–145)
TROPONIN, HIGH SENSITIVITY: 17 NG/L (ref 0–12)
WBC # BLD AUTO: 4.8 THOU/MM3 (ref 4.8–10.8)

## 2025-03-30 PROCEDURE — 1200000003 HC TELEMETRY R&B

## 2025-03-30 PROCEDURE — 6360000002 HC RX W HCPCS: Performed by: STUDENT IN AN ORGANIZED HEALTH CARE EDUCATION/TRAINING PROGRAM

## 2025-03-30 PROCEDURE — 82330 ASSAY OF CALCIUM: CPT

## 2025-03-30 PROCEDURE — 2580000003 HC RX 258: Performed by: INTERNAL MEDICINE

## 2025-03-30 PROCEDURE — 93005 ELECTROCARDIOGRAM TRACING: CPT | Performed by: STUDENT IN AN ORGANIZED HEALTH CARE EDUCATION/TRAINING PROGRAM

## 2025-03-30 PROCEDURE — 83735 ASSAY OF MAGNESIUM: CPT

## 2025-03-30 PROCEDURE — 6360000002 HC RX W HCPCS: Performed by: NURSE PRACTITIONER

## 2025-03-30 PROCEDURE — 36415 COLL VENOUS BLD VENIPUNCTURE: CPT

## 2025-03-30 PROCEDURE — 85025 COMPLETE CBC W/AUTO DIFF WBC: CPT

## 2025-03-30 PROCEDURE — 83690 ASSAY OF LIPASE: CPT

## 2025-03-30 PROCEDURE — 96374 THER/PROPH/DIAG INJ IV PUSH: CPT

## 2025-03-30 PROCEDURE — 84484 ASSAY OF TROPONIN QUANT: CPT

## 2025-03-30 PROCEDURE — 80053 COMPREHEN METABOLIC PANEL: CPT

## 2025-03-30 PROCEDURE — 82248 BILIRUBIN DIRECT: CPT

## 2025-03-30 PROCEDURE — 6360000002 HC RX W HCPCS

## 2025-03-30 PROCEDURE — 74177 CT ABD & PELVIS W/CONTRAST: CPT

## 2025-03-30 PROCEDURE — 84100 ASSAY OF PHOSPHORUS: CPT

## 2025-03-30 PROCEDURE — 99223 1ST HOSP IP/OBS HIGH 75: CPT

## 2025-03-30 PROCEDURE — 2580000003 HC RX 258: Performed by: NURSE PRACTITIONER

## 2025-03-30 PROCEDURE — 2580000003 HC RX 258

## 2025-03-30 PROCEDURE — 6360000004 HC RX CONTRAST MEDICATION: Performed by: STUDENT IN AN ORGANIZED HEALTH CARE EDUCATION/TRAINING PROGRAM

## 2025-03-30 PROCEDURE — 96376 TX/PRO/DX INJ SAME DRUG ADON: CPT

## 2025-03-30 PROCEDURE — 96375 TX/PRO/DX INJ NEW DRUG ADDON: CPT

## 2025-03-30 PROCEDURE — 2500000003 HC RX 250 WO HCPCS

## 2025-03-30 PROCEDURE — 2580000003 HC RX 258: Performed by: STUDENT IN AN ORGANIZED HEALTH CARE EDUCATION/TRAINING PROGRAM

## 2025-03-30 PROCEDURE — 6370000000 HC RX 637 (ALT 250 FOR IP): Performed by: STUDENT IN AN ORGANIZED HEALTH CARE EDUCATION/TRAINING PROGRAM

## 2025-03-30 PROCEDURE — 6370000000 HC RX 637 (ALT 250 FOR IP)

## 2025-03-30 RX ORDER — SODIUM CHLORIDE, SODIUM LACTATE, POTASSIUM CHLORIDE, CALCIUM CHLORIDE 600; 310; 30; 20 MG/100ML; MG/100ML; MG/100ML; MG/100ML
INJECTION, SOLUTION INTRAVENOUS CONTINUOUS
Status: DISCONTINUED | OUTPATIENT
Start: 2025-03-30 | End: 2025-04-01

## 2025-03-30 RX ORDER — ACETAMINOPHEN 650 MG/1
650 SUPPOSITORY RECTAL EVERY 6 HOURS PRN
Status: DISCONTINUED | OUTPATIENT
Start: 2025-03-30 | End: 2025-04-01 | Stop reason: HOSPADM

## 2025-03-30 RX ORDER — SODIUM CHLORIDE 0.9 % (FLUSH) 0.9 %
5-40 SYRINGE (ML) INJECTION PRN
Status: DISCONTINUED | OUTPATIENT
Start: 2025-03-30 | End: 2025-04-01 | Stop reason: HOSPADM

## 2025-03-30 RX ORDER — LANOLIN ALCOHOL/MO/W.PET/CERES
100 CREAM (GRAM) TOPICAL DAILY
Status: DISCONTINUED | OUTPATIENT
Start: 2025-03-30 | End: 2025-04-01 | Stop reason: HOSPADM

## 2025-03-30 RX ORDER — MORPHINE SULFATE 4 MG/ML
4 INJECTION, SOLUTION INTRAMUSCULAR; INTRAVENOUS ONCE
Status: COMPLETED | OUTPATIENT
Start: 2025-03-30 | End: 2025-03-30

## 2025-03-30 RX ORDER — 0.9 % SODIUM CHLORIDE 0.9 %
1000 INTRAVENOUS SOLUTION INTRAVENOUS ONCE
Status: COMPLETED | OUTPATIENT
Start: 2025-03-30 | End: 2025-03-30

## 2025-03-30 RX ORDER — SODIUM CHLORIDE, SODIUM LACTATE, POTASSIUM CHLORIDE, CALCIUM CHLORIDE 600; 310; 30; 20 MG/100ML; MG/100ML; MG/100ML; MG/100ML
INJECTION, SOLUTION INTRAVENOUS CONTINUOUS
Status: DISCONTINUED | OUTPATIENT
Start: 2025-03-30 | End: 2025-03-30

## 2025-03-30 RX ORDER — ONDANSETRON 4 MG/1
4 TABLET, ORALLY DISINTEGRATING ORAL EVERY 8 HOURS PRN
Status: DISCONTINUED | OUTPATIENT
Start: 2025-03-30 | End: 2025-04-01 | Stop reason: HOSPADM

## 2025-03-30 RX ORDER — ONDANSETRON 2 MG/ML
4 INJECTION INTRAMUSCULAR; INTRAVENOUS ONCE
Status: COMPLETED | OUTPATIENT
Start: 2025-03-30 | End: 2025-03-30

## 2025-03-30 RX ORDER — POLYETHYLENE GLYCOL 3350 17 G/17G
17 POWDER, FOR SOLUTION ORAL DAILY PRN
Status: DISCONTINUED | OUTPATIENT
Start: 2025-03-30 | End: 2025-04-01 | Stop reason: HOSPADM

## 2025-03-30 RX ORDER — LABETALOL HYDROCHLORIDE 5 MG/ML
10 INJECTION, SOLUTION INTRAVENOUS ONCE
Status: COMPLETED | OUTPATIENT
Start: 2025-03-30 | End: 2025-03-30

## 2025-03-30 RX ORDER — SODIUM CHLORIDE 0.9 % (FLUSH) 0.9 %
5-40 SYRINGE (ML) INJECTION EVERY 12 HOURS SCHEDULED
Status: DISCONTINUED | OUTPATIENT
Start: 2025-03-30 | End: 2025-04-01 | Stop reason: HOSPADM

## 2025-03-30 RX ORDER — POTASSIUM CHLORIDE 1500 MG/1
40 TABLET, EXTENDED RELEASE ORAL PRN
Status: DISCONTINUED | OUTPATIENT
Start: 2025-03-30 | End: 2025-04-01 | Stop reason: HOSPADM

## 2025-03-30 RX ORDER — MAGNESIUM HYDROXIDE/ALUMINUM HYDROXICE/SIMETHICONE 120; 1200; 1200 MG/30ML; MG/30ML; MG/30ML
30 SUSPENSION ORAL ONCE
Status: COMPLETED | OUTPATIENT
Start: 2025-03-30 | End: 2025-03-30

## 2025-03-30 RX ORDER — ONDANSETRON 2 MG/ML
4 INJECTION INTRAMUSCULAR; INTRAVENOUS EVERY 6 HOURS PRN
Status: DISCONTINUED | OUTPATIENT
Start: 2025-03-30 | End: 2025-04-01 | Stop reason: HOSPADM

## 2025-03-30 RX ORDER — IOPAMIDOL 755 MG/ML
80 INJECTION, SOLUTION INTRAVASCULAR
Status: COMPLETED | OUTPATIENT
Start: 2025-03-30 | End: 2025-03-30

## 2025-03-30 RX ORDER — FAMOTIDINE 20 MG/1
20 TABLET, FILM COATED ORAL ONCE
Status: COMPLETED | OUTPATIENT
Start: 2025-03-30 | End: 2025-03-30

## 2025-03-30 RX ORDER — SODIUM CHLORIDE 9 MG/ML
INJECTION, SOLUTION INTRAVENOUS PRN
Status: DISCONTINUED | OUTPATIENT
Start: 2025-03-30 | End: 2025-04-01 | Stop reason: HOSPADM

## 2025-03-30 RX ORDER — ENOXAPARIN SODIUM 100 MG/ML
40 INJECTION SUBCUTANEOUS DAILY
Status: DISCONTINUED | OUTPATIENT
Start: 2025-03-30 | End: 2025-04-01 | Stop reason: HOSPADM

## 2025-03-30 RX ORDER — AMLODIPINE BESYLATE 10 MG/1
10 TABLET ORAL DAILY
Status: DISCONTINUED | OUTPATIENT
Start: 2025-03-30 | End: 2025-04-01 | Stop reason: HOSPADM

## 2025-03-30 RX ORDER — POTASSIUM CHLORIDE 7.45 MG/ML
10 INJECTION INTRAVENOUS PRN
Status: DISCONTINUED | OUTPATIENT
Start: 2025-03-30 | End: 2025-04-01 | Stop reason: HOSPADM

## 2025-03-30 RX ORDER — ACETAMINOPHEN 325 MG/1
650 TABLET ORAL EVERY 6 HOURS PRN
Status: DISCONTINUED | OUTPATIENT
Start: 2025-03-30 | End: 2025-04-01 | Stop reason: HOSPADM

## 2025-03-30 RX ORDER — MAGNESIUM SULFATE IN WATER 40 MG/ML
2000 INJECTION, SOLUTION INTRAVENOUS PRN
Status: DISCONTINUED | OUTPATIENT
Start: 2025-03-30 | End: 2025-04-01 | Stop reason: HOSPADM

## 2025-03-30 RX ADMIN — SODIUM CHLORIDE 1000 ML: 0.9 INJECTION, SOLUTION INTRAVENOUS at 16:29

## 2025-03-30 RX ADMIN — HYDROMORPHONE HYDROCHLORIDE 0.25 MG: 1 INJECTION, SOLUTION INTRAMUSCULAR; INTRAVENOUS; SUBCUTANEOUS at 04:24

## 2025-03-30 RX ADMIN — SODIUM CHLORIDE, SODIUM LACTATE, POTASSIUM CHLORIDE, AND CALCIUM CHLORIDE: .6; .31; .03; .02 INJECTION, SOLUTION INTRAVENOUS at 04:24

## 2025-03-30 RX ADMIN — SODIUM CHLORIDE, PRESERVATIVE FREE 10 ML: 5 INJECTION INTRAVENOUS at 08:38

## 2025-03-30 RX ADMIN — HYDROMORPHONE HYDROCHLORIDE 0.5 MG: 1 INJECTION, SOLUTION INTRAMUSCULAR; INTRAVENOUS; SUBCUTANEOUS at 08:37

## 2025-03-30 RX ADMIN — ONDANSETRON 4 MG: 2 INJECTION, SOLUTION INTRAMUSCULAR; INTRAVENOUS at 00:49

## 2025-03-30 RX ADMIN — SODIUM CHLORIDE, POTASSIUM CHLORIDE, SODIUM LACTATE AND CALCIUM CHLORIDE: 600; 310; 30; 20 INJECTION, SOLUTION INTRAVENOUS at 22:16

## 2025-03-30 RX ADMIN — MORPHINE SULFATE 4 MG: 4 INJECTION, SOLUTION INTRAMUSCULAR; INTRAVENOUS at 00:49

## 2025-03-30 RX ADMIN — AMLODIPINE BESYLATE 10 MG: 10 TABLET ORAL at 23:00

## 2025-03-30 RX ADMIN — ALUMINUM HYDROXIDE, MAGNESIUM HYDROXIDE, AND SIMETHICONE 30 ML: 200; 200; 20 SUSPENSION ORAL at 00:48

## 2025-03-30 RX ADMIN — ONDANSETRON 4 MG: 2 INJECTION, SOLUTION INTRAMUSCULAR; INTRAVENOUS at 23:06

## 2025-03-30 RX ADMIN — LABETALOL HYDROCHLORIDE 10 MG: 5 INJECTION, SOLUTION INTRAVENOUS at 21:19

## 2025-03-30 RX ADMIN — SODIUM CHLORIDE, POTASSIUM CHLORIDE, SODIUM LACTATE AND CALCIUM CHLORIDE: 600; 310; 30; 20 INJECTION, SOLUTION INTRAVENOUS at 15:53

## 2025-03-30 RX ADMIN — HYDROMORPHONE HYDROCHLORIDE 0.5 MG: 1 INJECTION, SOLUTION INTRAMUSCULAR; INTRAVENOUS; SUBCUTANEOUS at 12:26

## 2025-03-30 RX ADMIN — IOPAMIDOL 80 ML: 755 INJECTION, SOLUTION INTRAVENOUS at 00:54

## 2025-03-30 RX ADMIN — ENOXAPARIN SODIUM 40 MG: 100 INJECTION SUBCUTANEOUS at 08:38

## 2025-03-30 RX ADMIN — FAMOTIDINE 20 MG: 20 TABLET, FILM COATED ORAL at 00:47

## 2025-03-30 RX ADMIN — MORPHINE SULFATE 4 MG: 4 INJECTION, SOLUTION INTRAMUSCULAR; INTRAVENOUS at 02:37

## 2025-03-30 RX ADMIN — HYDROMORPHONE HYDROCHLORIDE 0.5 MG: 1 INJECTION, SOLUTION INTRAMUSCULAR; INTRAVENOUS; SUBCUTANEOUS at 23:00

## 2025-03-30 RX ADMIN — SODIUM CHLORIDE 1000 ML: 0.9 INJECTION, SOLUTION INTRAVENOUS at 01:04

## 2025-03-30 ASSESSMENT — PAIN SCALES - GENERAL
PAINLEVEL_OUTOF10: 9
PAINLEVEL_OUTOF10: 6
PAINLEVEL_OUTOF10: 5
PAINLEVEL_OUTOF10: 4
PAINLEVEL_OUTOF10: 7
PAINLEVEL_OUTOF10: 4
PAINLEVEL_OUTOF10: 6
PAINLEVEL_OUTOF10: 7
PAINLEVEL_OUTOF10: 4
PAINLEVEL_OUTOF10: 5
PAINLEVEL_OUTOF10: 5
PAINLEVEL_OUTOF10: 7
PAINLEVEL_OUTOF10: 5
PAINLEVEL_OUTOF10: 8
PAINLEVEL_OUTOF10: 8
PAINLEVEL_OUTOF10: 9
PAINLEVEL_OUTOF10: 5

## 2025-03-30 ASSESSMENT — PAIN DESCRIPTION - ORIENTATION
ORIENTATION: LEFT
ORIENTATION: LOWER;MID
ORIENTATION: LEFT
ORIENTATION: LOWER;LEFT;RIGHT
ORIENTATION: LEFT
ORIENTATION: LEFT;RIGHT;LOWER

## 2025-03-30 ASSESSMENT — PAIN DESCRIPTION - DESCRIPTORS
DESCRIPTORS: ACHING;SHARP;SORE
DESCRIPTORS: SHARP;SHOOTING
DESCRIPTORS: DULL
DESCRIPTORS: DULL
DESCRIPTORS: ACHING;SHARP;SHOOTING

## 2025-03-30 ASSESSMENT — PAIN - FUNCTIONAL ASSESSMENT
PAIN_FUNCTIONAL_ASSESSMENT: 0-10
PAIN_FUNCTIONAL_ASSESSMENT: 0-10

## 2025-03-30 ASSESSMENT — PAIN DESCRIPTION - LOCATION
LOCATION: ABDOMEN
LOCATION: ABDOMEN;BACK
LOCATION: ABDOMEN

## 2025-03-30 NOTE — ED PROVIDER NOTES
- Abnormal; Notable for the following components:    BUN 7 (*)     All other components within normal limits   TROPONIN - Abnormal; Notable for the following components:    Troponin, High Sensitivity 17 (*)     All other components within normal limits   HEPATIC FUNCTION PANEL   ANION GAP   OSMOLALITY   GLOMERULAR FILTRATION RATE, ESTIMATED           MEDICAL DECISION MAKING / ED COURSE:     ED Course as of 03/30/25 0216   Sun Mar 30, 2025   0027 The patient is speaking full sentences and appears to be displaying capacity.  He does not have any slurred speech.  His gait is normal.  He is able to elaborate on his history [TM]   0039 EKG 12 Lead  EKG is reviewed by myself.  It shows a sinus rhythm, normal ND, QRS, and QTc intervals.  No ectopy and no acute ischemic changes.   [TM]   0115 Troponin, High Sensitivity(!): 17  Around baseline [TM]   0135 Lipase(!): 220.0 [TM]   0150 BISAP is 0 [TM]   0209 CT ABDOMEN PELVIS W IV CONTRAST Additional Contrast? None  IMPRESSION:  1. Acute current pancreatitis.  2. Infiltrative intermediate attenuation process encasing the right   lateral margin of the distal esophagus at the GE junction and extending   along the right diaphragmatic crura, new vs prior. This is likely due to   the pancreatitis changes. No drainable collection.  3. Significant decrease in peripancreatic fluid collections most   consistent with pseudocysts.  4. Stable cystic tail lesion.  5. Chronic splenic vein thrombosis.   [TM]   0214 Shared decision making employed with the patient regarding disposition, he states the pain is not tolerable he would like further pain medications would like to be admitted.  I will reach out to the hospitalist [TM]      ED Course User Index  [TM] Deepak Juarez MD              Summary of Patient Presentation (see ED course if left blank):      54-year-old male presents today with epigastric abdominal pain after drinking.  Meets diagnostic arteria for acute alcoholic pancreatitis.

## 2025-03-30 NOTE — PLAN OF CARE
Problem: Discharge Planning  Goal: Discharge to home or other facility with appropriate resources  3/30/2025 0919 by Phoebe Mcghee RN  Outcome: Progressing  3/30/2025 0456 by Micki Baldwin RN  Outcome: Progressing     Problem: Pain  Goal: Verbalizes/displays adequate comfort level or baseline comfort level  3/30/2025 0919 by Phoebe Mcghee RN  Outcome: Progressing  3/30/2025 0456 by Micki Baldwin RN  Outcome: Progressing     Problem: Gastrointestinal - Adult  Goal: Minimal or absence of nausea and vomiting  3/30/2025 0919 by Phoebe Mcghee RN  Outcome: Progressing  3/30/2025 0456 by Micki Baldwin RN  Outcome: Progressing  Goal: Maintains or returns to baseline bowel function  3/30/2025 0919 by Phoebe Mcghee RN  Outcome: Progressing  3/30/2025 0456 by Micki Baldwin RN  Outcome: Progressing  Goal: Maintains adequate nutritional intake  3/30/2025 0919 by Phoebe Mcghee RN  Outcome: Progressing  3/30/2025 0456 by Micki Baldwin RN  Outcome: Progressing

## 2025-03-30 NOTE — ED TRIAGE NOTES
Pt presents to ED from home w/ c/o abd pain. Pt reports abd pain has been on and off today since 2230. Pt reports pain will come and go since onset. Pt reports abd pain as 10/10 and describes as burning and cramping. Pt states he had 2x beers prior to arrival. Pt denies taking any medication prior to arrival. Respirations even and unlabored upon presentation and is 95% on room air. Pt reports hx of hypertension and reports he is not currently taking medication or being seen at this time for this.

## 2025-03-30 NOTE — PLAN OF CARE
Admitted by nights for pancreatitis.  Chart reviewed.  Continues with abd pain 7/10   Requiring IV dialudid  Stop CL diet  Increase IVF to 250.   Increase Dilaudid pain panel.  Awaiting GI evaluation.     Tio Richards, APRN - CNP

## 2025-03-30 NOTE — H&P
Hospitalist History & Physical         Patient: Stone Hull 54 y.o. male      : 1970  Date of Admission: 3/29/2025  Date of Service: Pt seen/examined on 25 and Admitted to Inpatient with expected LOS greater than two midnights due to medical therapy.         ASSESSMENT AND PLAN    Abdominal pain likely 2/2 Acute pancreatitis: see CT imaging below, and abdominal pain 8/10.  IVF  NPO for now, advance diet as tolerated encouraging PO.   GI consult in am for further recommendations  Strict I/O  Telemetry   Multimodal pain management.    Chronic Conditions (reviewed and stable unless otherwise stated)   Alcohol use: patient denies any issues in the past with cessation. Denies drinking daily. No observed signs of withdrawal during last admission.   Primary hypertension: consider resuming home medications pending clinical course.   Tobacco abuse: patient counseled on cessation, refused replacement.       Data reviewed (unless otherwise discussed in assessment/plan)  EKG:  I have reviewed the EKG with the following interpretation: NSR  Imaging: I have reviewed CT A/P with the following interpretation: Acute recurrent pancreatitis. Infiltrative intermediate attenuation process encasing the right lateral margin of the distal esophagus at the GE junction and extending along the right diaphragmatic curra, new vs prior. This is likely due to the pancreatitis changes. No drainable collection. Significant decrease in peripancreatic fluid collections most consistent with pseudocysts. Stable cystic tail lesion. Chronic splenic vein thrombosis.   Labs: Reviewed, see chart and plan above.       =======================================================================    SUBJECTIVE    Chief Complaint:  abdominal pain    History Of Present Illness:  Stone Hull is a 54 y.o. male with PMHx of HTN, Pancreatitis, alcohol use, tobacco use who presents to [facility] with abdominal pain.  Patient admits to

## 2025-03-31 LAB
ANION GAP SERPL CALC-SCNC: 10 MEQ/L (ref 8–16)
BASOPHILS ABSOLUTE: 0 THOU/MM3 (ref 0–0.1)
BASOPHILS NFR BLD AUTO: 0.2 %
BUN SERPL-MCNC: 7 MG/DL (ref 8–23)
CALCIUM SERPL-MCNC: 9.5 MG/DL (ref 8.6–10)
CHLORIDE SERPL-SCNC: 101 MEQ/L (ref 98–111)
CO2 SERPL-SCNC: 27 MEQ/L (ref 22–29)
CREAT SERPL-MCNC: 0.8 MG/DL (ref 0.7–1.2)
DEPRECATED RDW RBC AUTO: 54.3 FL (ref 35–45)
EOSINOPHIL NFR BLD AUTO: 1.8 %
EOSINOPHILS ABSOLUTE: 0.1 THOU/MM3 (ref 0–0.4)
ERYTHROCYTE [DISTWIDTH] IN BLOOD BY AUTOMATED COUNT: 17 % (ref 11.5–14.5)
GFR SERPL CREATININE-BSD FRML MDRD: > 90 ML/MIN/1.73M2
GLUCOSE SERPL-MCNC: 91 MG/DL (ref 74–109)
HCT VFR BLD AUTO: 36.1 % (ref 42–52)
HGB BLD-MCNC: 11.4 GM/DL (ref 14–18)
IMM GRANULOCYTES # BLD AUTO: 0.01 THOU/MM3 (ref 0–0.07)
IMM GRANULOCYTES NFR BLD AUTO: 0.2 %
LIPASE SERPL-CCNC: 92 U/L (ref 13–60)
LYMPHOCYTES ABSOLUTE: 1 THOU/MM3 (ref 1–4.8)
LYMPHOCYTES NFR BLD AUTO: 22.2 %
MAGNESIUM SERPL-MCNC: 1.8 MG/DL (ref 1.6–2.6)
MCH RBC QN AUTO: 27.8 PG (ref 26–33)
MCHC RBC AUTO-ENTMCNC: 31.6 GM/DL (ref 32.2–35.5)
MCV RBC AUTO: 88 FL (ref 80–94)
MONOCYTES ABSOLUTE: 0.5 THOU/MM3 (ref 0.4–1.3)
MONOCYTES NFR BLD AUTO: 10.1 %
NEUTROPHILS ABSOLUTE: 2.9 THOU/MM3 (ref 1.8–7.7)
NEUTROPHILS NFR BLD AUTO: 65.5 %
NRBC BLD AUTO-RTO: 0 /100 WBC
PLATELET # BLD AUTO: 181 THOU/MM3 (ref 130–400)
PMV BLD AUTO: 9.3 FL (ref 9.4–12.4)
POTASSIUM SERPL-SCNC: 4.4 MEQ/L (ref 3.5–5.2)
RBC # BLD AUTO: 4.1 MILL/MM3 (ref 4.7–6.1)
SODIUM SERPL-SCNC: 138 MEQ/L (ref 135–145)
WBC # BLD AUTO: 4.5 THOU/MM3 (ref 4.8–10.8)

## 2025-03-31 PROCEDURE — 85025 COMPLETE CBC W/AUTO DIFF WBC: CPT

## 2025-03-31 PROCEDURE — 6370000000 HC RX 637 (ALT 250 FOR IP): Performed by: INTERNAL MEDICINE

## 2025-03-31 PROCEDURE — 99233 SBSQ HOSP IP/OBS HIGH 50: CPT | Performed by: NURSE PRACTITIONER

## 2025-03-31 PROCEDURE — 83735 ASSAY OF MAGNESIUM: CPT

## 2025-03-31 PROCEDURE — 83690 ASSAY OF LIPASE: CPT

## 2025-03-31 PROCEDURE — 6370000000 HC RX 637 (ALT 250 FOR IP): Performed by: NURSE PRACTITIONER

## 2025-03-31 PROCEDURE — 36415 COLL VENOUS BLD VENIPUNCTURE: CPT

## 2025-03-31 PROCEDURE — 2580000003 HC RX 258: Performed by: NURSE PRACTITIONER

## 2025-03-31 PROCEDURE — 1200000003 HC TELEMETRY R&B

## 2025-03-31 PROCEDURE — 6370000000 HC RX 637 (ALT 250 FOR IP)

## 2025-03-31 PROCEDURE — 6360000002 HC RX W HCPCS

## 2025-03-31 PROCEDURE — 80048 BASIC METABOLIC PNL TOTAL CA: CPT

## 2025-03-31 RX ORDER — OXYCODONE AND ACETAMINOPHEN 5; 325 MG/1; MG/1
2 TABLET ORAL EVERY 4 HOURS PRN
Refills: 0 | Status: DISCONTINUED | OUTPATIENT
Start: 2025-03-31 | End: 2025-04-01 | Stop reason: HOSPADM

## 2025-03-31 RX ORDER — OXYCODONE AND ACETAMINOPHEN 5; 325 MG/1; MG/1
1 TABLET ORAL EVERY 4 HOURS PRN
Refills: 0 | Status: DISCONTINUED | OUTPATIENT
Start: 2025-03-31 | End: 2025-04-01 | Stop reason: HOSPADM

## 2025-03-31 RX ADMIN — ONDANSETRON 4 MG: 2 INJECTION, SOLUTION INTRAMUSCULAR; INTRAVENOUS at 19:31

## 2025-03-31 RX ADMIN — SODIUM CHLORIDE, POTASSIUM CHLORIDE, SODIUM LACTATE AND CALCIUM CHLORIDE: 600; 310; 30; 20 INJECTION, SOLUTION INTRAVENOUS at 07:09

## 2025-03-31 RX ADMIN — Medication 100 MG: at 08:43

## 2025-03-31 RX ADMIN — SODIUM CHLORIDE, POTASSIUM CHLORIDE, SODIUM LACTATE AND CALCIUM CHLORIDE 1000 ML: 600; 310; 30; 20 INJECTION, SOLUTION INTRAVENOUS at 02:33

## 2025-03-31 RX ADMIN — OXYCODONE HYDROCHLORIDE AND ACETAMINOPHEN 2 TABLET: 5; 325 TABLET ORAL at 19:31

## 2025-03-31 RX ADMIN — AMLODIPINE BESYLATE 10 MG: 10 TABLET ORAL at 08:43

## 2025-03-31 RX ADMIN — OXYCODONE HYDROCHLORIDE AND ACETAMINOPHEN 2 TABLET: 5; 325 TABLET ORAL at 08:58

## 2025-03-31 ASSESSMENT — PAIN SCALES - GENERAL
PAINLEVEL_OUTOF10: 7
PAINLEVEL_OUTOF10: 0
PAINLEVEL_OUTOF10: 6
PAINLEVEL_OUTOF10: 0
PAINLEVEL_OUTOF10: 7

## 2025-03-31 ASSESSMENT — PAIN DESCRIPTION - ORIENTATION: ORIENTATION: LEFT

## 2025-03-31 ASSESSMENT — PAIN DESCRIPTION - LOCATION
LOCATION: BACK

## 2025-03-31 ASSESSMENT — PAIN DESCRIPTION - DESCRIPTORS
DESCRIPTORS: ACHING
DESCRIPTORS: ACHING;SORE

## 2025-03-31 ASSESSMENT — PAIN DESCRIPTION - ONSET: ONSET: ON-GOING

## 2025-03-31 ASSESSMENT — PAIN SCALES - WONG BAKER
WONGBAKER_NUMERICALRESPONSE: NO HURT
WONGBAKER_NUMERICALRESPONSE: NO HURT

## 2025-03-31 ASSESSMENT — PAIN DESCRIPTION - PAIN TYPE: TYPE: ACUTE PAIN

## 2025-03-31 ASSESSMENT — PAIN - FUNCTIONAL ASSESSMENT: PAIN_FUNCTIONAL_ASSESSMENT: ACTIVITIES ARE NOT PREVENTED

## 2025-03-31 ASSESSMENT — PAIN DESCRIPTION - FREQUENCY: FREQUENCY: INTERMITTENT

## 2025-03-31 NOTE — PLAN OF CARE
Problem: Discharge Planning  Goal: Discharge to home or other facility with appropriate resources  3/31/2025 0913 by Phoebe Mcghee RN  Outcome: Progressing  3/30/2025 2111 by Micki Baldwin RN  Outcome: Progressing     Problem: Pain  Goal: Verbalizes/displays adequate comfort level or baseline comfort level  3/31/2025 0913 by Phoebe Mcghee RN  Outcome: Progressing  3/30/2025 2111 by Micki Baldwin RN  Outcome: Progressing     Problem: Gastrointestinal - Adult  Goal: Minimal or absence of nausea and vomiting  3/31/2025 0913 by Phoebe Mcghee RN  Outcome: Progressing  3/30/2025 2111 by Micki Baldwin RN  Outcome: Progressing  Goal: Maintains or returns to baseline bowel function  3/31/2025 0913 by Phoebe Mcghee RN  Outcome: Progressing  3/30/2025 2111 by Micki Baldwin RN  Outcome: Progressing  Goal: Maintains adequate nutritional intake  3/31/2025 0913 by Phoebe Mcghee RN  Outcome: Progressing  3/30/2025 2111 by Micki Baldwin RN  Outcome: Progressing

## 2025-03-31 NOTE — CARE COORDINATION
Case Management Assessment Initial Evaluation    Date/Time of Evaluation: 3/31/2025 2:37 PM  Assessment Completed by: Bella Lima RN    If patient is discharged prior to next notation, then this note serves as note for discharge by case management.    Patient Name: Stone Hull                   YOB: 1970  Diagnosis: Recurrent acute pancreatitis [K85.90]  Pancreatic pseudocyst [K86.3]  Acute pancreatitis, unspecified complication status, unspecified pancreatitis type [K85.90]  Chronic thrombosis of splenic vein [I82.891]                   Date / Time: 3/29/2025 11:47 PM  Location: [facility]27/027-A     Patient Admission Status: Inpatient   Readmission Risk Low 0-14, Mod 15-19), High > 20: Readmission Risk Score: 11.8    Current PCP: Wu Cardozo MD  Health Care Decision Makers:     Additional Case Management Notes: Admitted from ER with abd pain on 3-29-25. Hx ETOH induced pancreatitis. Lipase 220.0. Imaging reveals pancreatitis. GI consulted. LR at 150/hr. Percocet for pain. Clear liquid diet ordered today.     Procedures: No    Imaging: 3-29-25   CT ABDOMEN PELVIS W IV CONTRAST Additional Contrast? None   Final Result   1. Acute current pancreatitis.   2. Infiltrative intermediate attenuation process encasing the right    lateral margin of the distal esophagus at the GE junction and extending    along the right diaphragmatic crura, new vs prior. This is likely due to    the pancreatitis changes. No drainable collection.   3. Significant decrease in peripancreatic fluid collections most    consistent with pseudocysts.   4. Stable cystic tail lesion.   5. Chronic splenic vein thrombosis.       Patient Goals/Plan/Treatment Preferences: Stone is from home where he shares an apartment with a roommate whom he gets along with fine. Pt is independent although he does not drive. He uses bus system or walks. He does not work currently.     Tio Rivera NP advised this CM that pt [employer]

## 2025-03-31 NOTE — CONSULTS
[place] [id number]                              CONSULTATION      PATIENT NAME: LINNAE PALMER               : 1970  MED REC NO: [id number]                       ROOM: [facility]  ACCOUNT NO: [id number]                       ADMIT DATE: 2025  PROVIDER: Carlo Gay MD      CONSULT DATE: 2025    REASON FOR CONSULT:  For further evaluation of abdominal pain and treatment recommendations regarding acute pancreatitis.    HISTORY OF PRESENT ILLNESS:  The patient is a 54-year-old pleasant male, who has a history of alcohol use.  The patient drinks 3-4 beers on a bad day.  Denied any illicit drug who presented to the emergency room, complains of epigastric abdominal pain, and dyspepsia.  The patient reports that at his friend's birthday party, the patient had 3 beers and then he started having epigastric abdominal pain, cramping type of pain, radiated into back, associated with nausea.  No vomiting.  Denied any diarrhea.  Denied any blood in the stool or black stools.  The pain went up to 10 on the pain scale of 0-10.  Because of worsening of pain, he presented to the emergency room.  The patient denied any other over-the-counter medications.  The patient had history of pancreatitis in the past.  The patient had blood workup, showed; sodium 142, potassium 4.2, chloride 110, CO2 23, glucose 89, BUN 6, creatinine 0.8, magnesium 2.1, lipase 220, albumin 3.9, total bilirubin 0.3, alkaline phosphatase 97, AST 33, ALT 25, total protein 7.0.  WBC 4.8, hemoglobin 11.8, hematocrit 37.5, MCV 88.4, platelet count 212,000.  CT scan of the abdomen and pelvis was reported as; no solid liver mass, acute pancreatitis, infiltrative intermediate attenuation process encasing the right lateral margin of the distal esophagus at the GE junction extending along the right diaphragmatic crura, likely due to pancreatitis.  No drainable collection.

## 2025-03-31 NOTE — PLAN OF CARE
Problem: Discharge Planning  Goal: Discharge to home or other facility with appropriate resources  3/30/2025 2111 by Micki Baldwin RN  Outcome: Progressing  3/30/2025 0919 by Phoebe Mcghee RN  Outcome: Progressing     Problem: Pain  Goal: Verbalizes/displays adequate comfort level or baseline comfort level  3/30/2025 2111 by Micki Baldwin RN  Outcome: Progressing  3/30/2025 0919 by Phoebe Mcghee RN  Outcome: Progressing     Problem: Gastrointestinal - Adult  Goal: Minimal or absence of nausea and vomiting  3/30/2025 2111 by Micki Baldwin RN  Outcome: Progressing  3/30/2025 0919 by Phoebe Mcghee RN  Outcome: Progressing  Goal: Maintains or returns to baseline bowel function  3/30/2025 2111 by Micki Baldwin RN  Outcome: Progressing  3/30/2025 0919 by Phoebe Mcghee RN  Outcome: Progressing  Goal: Maintains adequate nutritional intake  3/30/2025 2111 by Micki Baldwin RN  Outcome: Progressing  3/30/2025 0919 by Phoebe Mcghee RN  Outcome: Progressing

## 2025-04-01 VITALS
HEART RATE: 62 BPM | DIASTOLIC BLOOD PRESSURE: 79 MMHG | TEMPERATURE: 97.8 F | SYSTOLIC BLOOD PRESSURE: 155 MMHG | BODY MASS INDEX: 26.43 KG/M2 | HEIGHT: 67 IN | RESPIRATION RATE: 21 BRPM | WEIGHT: 168.4 LBS | OXYGEN SATURATION: 97 %

## 2025-04-01 LAB
ANION GAP SERPL CALC-SCNC: 10 MEQ/L (ref 8–16)
BASOPHILS ABSOLUTE: 0 THOU/MM3 (ref 0–0.1)
BASOPHILS NFR BLD AUTO: 0.7 %
BUN SERPL-MCNC: 7 MG/DL (ref 8–23)
CALCIUM SERPL-MCNC: 9.5 MG/DL (ref 8.6–10)
CHLORIDE SERPL-SCNC: 102 MEQ/L (ref 98–111)
CO2 SERPL-SCNC: 25 MEQ/L (ref 22–29)
CREAT SERPL-MCNC: 0.8 MG/DL (ref 0.7–1.2)
DEPRECATED RDW RBC AUTO: 53.3 FL (ref 35–45)
EOSINOPHIL NFR BLD AUTO: 2.8 %
EOSINOPHILS ABSOLUTE: 0.1 THOU/MM3 (ref 0–0.4)
ERYTHROCYTE [DISTWIDTH] IN BLOOD BY AUTOMATED COUNT: 16.7 % (ref 11.5–14.5)
GFR SERPL CREATININE-BSD FRML MDRD: > 90 ML/MIN/1.73M2
GLUCOSE SERPL-MCNC: 102 MG/DL (ref 74–109)
HCT VFR BLD AUTO: 36.6 % (ref 42–52)
HGB BLD-MCNC: 11.7 GM/DL (ref 14–18)
IMM GRANULOCYTES # BLD AUTO: 0.01 THOU/MM3 (ref 0–0.07)
IMM GRANULOCYTES NFR BLD AUTO: 0.3 %
LYMPHOCYTES ABSOLUTE: 1 THOU/MM3 (ref 1–4.8)
LYMPHOCYTES NFR BLD AUTO: 34.5 %
MAGNESIUM SERPL-MCNC: 1.8 MG/DL (ref 1.6–2.6)
MCH RBC QN AUTO: 28.1 PG (ref 26–33)
MCHC RBC AUTO-ENTMCNC: 32 GM/DL (ref 32.2–35.5)
MCV RBC AUTO: 88 FL (ref 80–94)
MONOCYTES ABSOLUTE: 0.3 THOU/MM3 (ref 0.4–1.3)
MONOCYTES NFR BLD AUTO: 9.8 %
NEUTROPHILS ABSOLUTE: 1.5 THOU/MM3 (ref 1.8–7.7)
NEUTROPHILS NFR BLD AUTO: 51.9 %
NRBC BLD AUTO-RTO: 0 /100 WBC
PLATELET # BLD AUTO: 178 THOU/MM3 (ref 130–400)
PMV BLD AUTO: 9.1 FL (ref 9.4–12.4)
POTASSIUM SERPL-SCNC: 4 MEQ/L (ref 3.5–5.2)
RBC # BLD AUTO: 4.16 MILL/MM3 (ref 4.7–6.1)
SCAN OF BLOOD SMEAR: NORMAL
SODIUM SERPL-SCNC: 137 MEQ/L (ref 135–145)
WBC # BLD AUTO: 2.9 THOU/MM3 (ref 4.8–10.8)

## 2025-04-01 PROCEDURE — 80048 BASIC METABOLIC PNL TOTAL CA: CPT

## 2025-04-01 PROCEDURE — 85025 COMPLETE CBC W/AUTO DIFF WBC: CPT

## 2025-04-01 PROCEDURE — 99239 HOSP IP/OBS DSCHRG MGMT >30: CPT | Performed by: NURSE PRACTITIONER

## 2025-04-01 PROCEDURE — 2580000003 HC RX 258: Performed by: NURSE PRACTITIONER

## 2025-04-01 PROCEDURE — 83735 ASSAY OF MAGNESIUM: CPT

## 2025-04-01 PROCEDURE — 6370000000 HC RX 637 (ALT 250 FOR IP): Performed by: PHYSICIAN ASSISTANT

## 2025-04-01 PROCEDURE — 6370000000 HC RX 637 (ALT 250 FOR IP): Performed by: INTERNAL MEDICINE

## 2025-04-01 PROCEDURE — 36415 COLL VENOUS BLD VENIPUNCTURE: CPT

## 2025-04-01 PROCEDURE — 6370000000 HC RX 637 (ALT 250 FOR IP)

## 2025-04-01 RX ORDER — CLONIDINE HYDROCHLORIDE 0.1 MG/1
0.1 TABLET ORAL 2 TIMES DAILY
Status: DISCONTINUED | OUTPATIENT
Start: 2025-04-01 | End: 2025-04-01 | Stop reason: HOSPADM

## 2025-04-01 RX ADMIN — Medication 100 MG: at 09:47

## 2025-04-01 RX ADMIN — SODIUM CHLORIDE, POTASSIUM CHLORIDE, SODIUM LACTATE AND CALCIUM CHLORIDE: 600; 310; 30; 20 INJECTION, SOLUTION INTRAVENOUS at 02:12

## 2025-04-01 RX ADMIN — AMLODIPINE BESYLATE 10 MG: 10 TABLET ORAL at 09:49

## 2025-04-01 RX ADMIN — CLONIDINE HYDROCHLORIDE 0.1 MG: 0.1 TABLET ORAL at 02:12

## 2025-04-01 RX ADMIN — CLONIDINE HYDROCHLORIDE 0.1 MG: 0.1 TABLET ORAL at 09:47

## 2025-04-01 NOTE — CARE COORDINATION
4/1/25, 9:07 AM EDT    Patient goals/plan/ treatment preferences discussed by  and .  Patient goals/plan/ treatment preferences reviewed with patient/ family.  Patient/ family verbalize understanding of discharge plan and are in agreement with goal/plan/treatment preferences.  Understanding was demonstrated using the teach back method.  AVS provided by RN at time of discharge, which includes all necessary medical information pertaining to the patients current course of illness, treatment, post-discharge goals of care, and treatment preferences.     Services At/After Discharge: None    Returning home with roommate. Denies needs.

## 2025-04-01 NOTE — DISCHARGE SUMMARY
Hospital Medicine Discharge Summary      Patient Identification:   Stone Hull   : 1970  MRN: [id number]   Account: [id number]      Patient's PCP: Wu Cardozo MD    Admit Date: 3/29/2025     Discharge Date: 2025      Admitting Physician: Brian Coleman, APRN - CNP      Discharge Physician: Tio Richards, APRN - CNP     Discharge Diagnoses and Hospital Course:    Presented to the ED with abdominal pain.    Recurrent pancreatitis, resolved. Likely ETOH induced. CT of the abdomen confirmed pancreatitis, no fluid collection, probable pseudocyst. Kept NPO. Given aggressive IVF with LR. IV Diaudid for pain while NPO. Once pain resolved IV Dilaudid was discontinued. Oral Percocet started along with diet. GI Seen. Diet was advanced as tolerate to regular. Lipase trended down.   Primary hypertension: consider resuming home medications pending clinical course.   Tobacco abuse: patient counseled on cessation, refused replacement.      He remains pain free. Home today. OP Follow up GI as scheduled and with PCP in 5-7 days. Encouraged to not drink any alcohol.     The patient was seen and examined on day of discharge and this discharge summary is in conjunction with any daily progress note from day of discharge.        Exam:     Vitals:  Vitals:    25 0030 25 0115 25 0313 25 0930   BP: (!) 187/100  (!) 160/90 (!) 155/79   Pulse: 54  55 62   Resp: 18  20 21   Temp: 97.9 °F (36.6 °C)  98 °F (36.7 °C) 97.8 °F (36.6 °C)   TempSrc: Oral  Oral Oral   SpO2: 95%  97%    Weight:  76.4 kg (168 lb 6.4 oz)     Height:         Weight: Weight - Scale: 76.4 kg (168 lb 6.4 oz)     24 hour intake/output:  Intake/Output Summary (Last 24 hours) at 2025 1614  Last data filed at 2025 0313  Gross per 24 hour   Intake 2400 ml   Output 600 ml   Net 1800 ml         General appearance:  No apparent distress, appears stated age and cooperative.  HEENT:  Normal cephalic, atraumatic without

## 2025-04-01 NOTE — PROGRESS NOTES
Hospitalist Progress Note    Patient:  Stone Hull      Unit/Bed:8B-27/027-A    YOB: 1970    MRN: [id number]       Acct: [id number]     PCP: No primary care provider on file.    Date of Admission: 3/29/2025    Assessment/Plan:    Recurrent pancreatitis. Likely ETOH induced. GI Seen. Now pain free. Ok to start diet and advance as tolerate. Decreased IVF to 150 ml/hr. Stop IV Dilaudid. Add Perocet pain panel.  Primary hypertension: consider resuming home medications pending clinical course.   Tobacco abuse: patient counseled on cessation, refused replacement.       Chief Complaint: abdominal pain     Hospital Course:     Stone Hull is a 54 y.o. male with PMHx of HTN, Pancreatitis, alcohol use, tobacco use who presents to [facility] with abdominal pain.  Patient admits to having abdominal pain while at friend's birthday party after drinking a beer this evening. States that the pain was the same as prior episodes of acute pancreatitis. States that pain was a solid 8/10 and gnawing at his abdomen. Denies any fevers, chills, nausea, vomiting, and diarrhea.      ED course: labs drawn, imaging obtained, IVF given, pain medications given    Subjective: Denies abdominal pain. Started on clears and no return of pain. Feels ready to eat more. No CP/SOB      Medications:  Reviewed    Infusion Medications    sodium chloride      lactated ringers 150 mL/hr at 03/31/25 0900     Scheduled Medications    sodium chloride flush  5-40 mL IntraVENous 2 times per day    enoxaparin  40 mg SubCUTAneous Daily    thiamine  100 mg Oral Daily    amLODIPine  10 mg Oral Daily     PRN Meds: oxyCODONE-acetaminophen **OR** oxyCODONE-acetaminophen, sodium chloride flush, sodium chloride, potassium chloride **OR** potassium alternative oral replacement **OR** potassium chloride, magnesium sulfate, ondansetron **OR** ondansetron, polyethylene glycol, acetaminophen **OR** acetaminophen      Intake/Output Summary 
Discharge teaching and instructions for diagnosis/procedure of pancreatitis completed with patient using teachback method. AVS reviewed. Printed prescriptions given to patient. Patient voiced understanding regarding prescriptions, follow up appointments, and care of self at home. Discharged in a wheelchair to  home with support per  cab    
Patient educated on how to use incentive spirometer. Patient verbalized understanding and demonstrated proper use. Emphasized importance and usage of device, with coughing and deep breathing every 4 hours while awake.        
present    Patient Plan of Care: Spiritual Care available upon further referral  Family/Friends Plan of Care: No family/friends present    Electronically signed by Chaplain Da on 3/31/2025 at 2:29 PM

## 2025-04-01 NOTE — PLAN OF CARE
Problem: Discharge Planning  Goal: Discharge to home or other facility with appropriate resources  3/31/2025 2045 by Roro Redmond RN  Outcome: Progressing  Flowsheets (Taken 3/31/2025 1929)  Discharge to home or other facility with appropriate resources: Identify barriers to discharge with patient and caregiver     Problem: Pain  Goal: Verbalizes/displays adequate comfort level or baseline comfort level  3/31/2025 2045 by Roro Redmond RN  Outcome: Progressing     Problem: Gastrointestinal - Adult  Goal: Minimal or absence of nausea and vomiting  3/31/2025 2045 by Roro Redmond RN  Outcome: Progressing  Flowsheets (Taken 3/31/2025 1929)  Minimal or absence of nausea and vomiting: Administer IV fluids as ordered to ensure adequate hydration     Problem: Gastrointestinal - Adult  Goal: Maintains or returns to baseline bowel function  3/31/2025 2045 by Roro Redmond RN  Outcome: Progressing  Flowsheets (Taken 3/31/2025 1929)  Maintains or returns to baseline bowel function: Assess bowel function     Problem: Gastrointestinal - Adult  Goal: Maintains adequate nutritional intake  3/31/2025 2045 by Roro Redmond RN  Outcome: Progressing  Flowsheets (Taken 3/31/2025 1929)  Maintains adequate nutritional intake: Monitor percentage of each meal consumed

## 2025-04-02 ENCOUNTER — CARE COORDINATION (OUTPATIENT)
Dept: CARE COORDINATION | Age: 55
End: 2025-04-02

## 2025-04-02 NOTE — CARE COORDINATION
Care Transitions Note    Initial Call - Call within 2 business days of discharge: Yes    Attempted to reach patient for transitions of care follow up. Unable to reach patient.    Outreach Attempts:   Unable to leave message.     1st attempt to contact pt for initial care transition follow up.  Unable to reach pt.  Only one phone number listed for pt.  Recording states call cannot be completed as dialed.  Attempted to redial phone number.  Same recording came on.  Will try again at a later time.      Patient: Stone Hull    Patient : 1970   MRN: [id number]    Reason for Admission: Abdominal pain, Acute pancreatitis  Discharge Date: 25  RURS: Readmission Risk Score: 12.7    Last Discharge Facility       Date Complaint Diagnosis Description Type Department Provider    3/29/25 Abdominal Pain Acute pancreatitis, unspecified complication status, unspecified pancreatitis type ... ED to Hosp-Admission (Discharged) (ADMITTED) [facility] Tio Richards, APRN - C...            Was this an external facility discharge? No    Follow Up Appointment:   Patient has hospital follow up appointment scheduled within 7 days of discharge.    Future Appointments         Provider Specialty Dept Phone    2025 2:40 PM Gallito Kerr, DO Internal Medicine [phone]            Plan for follow-up on next business day.      Nona Stearns RN

## 2025-04-03 ENCOUNTER — CARE COORDINATION (OUTPATIENT)
Dept: CARE COORDINATION | Age: 55
End: 2025-04-03

## 2025-04-03 NOTE — CARE COORDINATION
Care Transitions Note    Initial Call - Call within 2 business days of discharge: Yes    Attempted to reach patient, family, sister  for transitions of care follow up. Unable to reach patient, family, sister .    Outreach Attempts:   Unable to leave message.     2nd attempt to contact pt for initial care transition follow up.  Unable to reach pt.  Only number listed for pt.  Recording states call cannot be completed as dialed.  Pt's sister listed on  HIPAA form.  Attempted to contact pt's sister Kody Badillo to get pt current number.  Unable to reach her.  No answer.      Program will be closed and CTN to sign off.    Patient: Stone Hull    Patient : 1970   MRN: [id number]    Reason for Admission: Abdominal pain, Acute pancreatitis  Discharge Date: 25  RURS: Readmission Risk Score: 12.7    Last Discharge Facility       Date Complaint Diagnosis Description Type Department Provider    3/29/25 Abdominal Pain Acute pancreatitis, unspecified complication status, unspecified pancreatitis type ... ED to Hosp-Admission (Discharged) (ADMITTED) Tio Prescott, APRN - C...            Was this an external facility discharge? No    Follow Up Appointment:   Patient has hospital follow up appointment scheduled within 7 days of discharge.    Future Appointments         Provider Specialty Dept Phone    2025 2:40 PM Gallito Kerr, DO Internal Medicine [phone]            No further follow-up call indicated     Nona Stearns RN